# Patient Record
Sex: MALE | Race: WHITE | NOT HISPANIC OR LATINO | Employment: FULL TIME | URBAN - METROPOLITAN AREA
[De-identification: names, ages, dates, MRNs, and addresses within clinical notes are randomized per-mention and may not be internally consistent; named-entity substitution may affect disease eponyms.]

---

## 2017-07-13 ENCOUNTER — APPOINTMENT (OUTPATIENT)
Dept: PHYSICAL THERAPY | Facility: CLINIC | Age: 62
End: 2017-07-13
Payer: COMMERCIAL

## 2017-07-13 PROCEDURE — 97162 PT EVAL MOD COMPLEX 30 MIN: CPT

## 2017-07-13 PROCEDURE — 97112 NEUROMUSCULAR REEDUCATION: CPT

## 2017-07-17 ENCOUNTER — APPOINTMENT (OUTPATIENT)
Dept: PHYSICAL THERAPY | Facility: CLINIC | Age: 62
End: 2017-07-17
Payer: COMMERCIAL

## 2017-07-17 PROCEDURE — 97112 NEUROMUSCULAR REEDUCATION: CPT

## 2017-07-27 ENCOUNTER — APPOINTMENT (OUTPATIENT)
Dept: PHYSICAL THERAPY | Facility: CLINIC | Age: 62
End: 2017-07-27
Payer: COMMERCIAL

## 2017-07-27 PROCEDURE — 97112 NEUROMUSCULAR REEDUCATION: CPT

## 2021-04-08 DIAGNOSIS — Z23 ENCOUNTER FOR IMMUNIZATION: ICD-10-CM

## 2021-07-29 ENCOUNTER — OFFICE VISIT (OUTPATIENT)
Dept: BARIATRICS | Facility: CLINIC | Age: 66
End: 2021-07-29
Payer: COMMERCIAL

## 2021-07-29 VITALS
HEIGHT: 72 IN | WEIGHT: 265.4 LBS | BODY MASS INDEX: 35.95 KG/M2 | TEMPERATURE: 98.3 F | HEART RATE: 76 BPM | SYSTOLIC BLOOD PRESSURE: 120 MMHG | DIASTOLIC BLOOD PRESSURE: 66 MMHG | RESPIRATION RATE: 16 BRPM

## 2021-07-29 DIAGNOSIS — E66.9 OBESITY, CLASS II, BMI 35-39.9: Primary | ICD-10-CM

## 2021-07-29 DIAGNOSIS — G47.30 SLEEP APNEA WITH USE OF CONTINUOUS POSITIVE AIRWAY PRESSURE (CPAP): ICD-10-CM

## 2021-07-29 PROBLEM — E66.812 OBESITY, CLASS II, BMI 35-39.9: Status: ACTIVE | Noted: 2021-07-29

## 2021-07-29 PROCEDURE — 99203 OFFICE O/P NEW LOW 30 MIN: CPT | Performed by: FAMILY MEDICINE

## 2021-07-29 RX ORDER — ATORVASTATIN CALCIUM 10 MG/1
TABLET, FILM COATED ORAL
COMMUNITY
Start: 2015-07-01

## 2021-07-29 RX ORDER — CHLORAL HYDRATE 500 MG
CAPSULE ORAL
COMMUNITY

## 2021-07-29 NOTE — PATIENT INSTRUCTIONS
[place], [age] [address]   [phone]    Visit [facility] for further information/injection instructions  Please eat small frequent meals to help reduce nausea  Lemon water and saltine crackers may help with this  If you experience fever, nausea/vomiting, and pain radiating to your back this may be a sign of pancreatitis  Please have ER evaluation with this occurs

## 2021-07-29 NOTE — PROGRESS NOTES
Assessment/Plan:        Diagnoses and all orders for this visit:    Obesity, Class II, BMI 35-39 9  -     Semaglutide-Weight Management (WEGOVY) 0 25 MG/0 5ML; Inject 0 5 mL (0 25 mg total) under the skin once a week for 4 doses    Sleep apnea with use of continuous positive airway pressure (CPAP)  -     Semaglutide-Weight Management (WEGOVY) 0 25 MG/0 5ML; Inject 0 5 mL (0 25 mg total) under the skin once a week for 4 doses    Other orders  -     Omega-3 Fatty Acids (fish oil) 1,000 mg; Take by mouth  -     atorvastatin (LIPITOR) 10 mg tablet      -Discussed options of [facility]-Intensive Lifestyle Intervention Program, Very Low Calorie Diet-VLCD and Conservative Program and the role of weight loss medications   -Initial weight loss goal of 5-10% weight loss for improved health  -Screening labs- got labs done today at [facility]  -Patient is interested in pursuing Conservative Program  - he will be sending me copy of his recent labs done today, via [email or web address]    - he wants to try [facility] as he read about and discussed it with his PCP  We discussed wegovy in detail, discussed use, titration, admionistration and common side effects  Goals:  Food log (ie ) www myfitnesspal com,sparkpeople  com,loseit com,calorieking  com,etc  baritastic  No sugary beverages  At least 64oz of water daily  Increase physical activity by 10 minutes daily  Gradually increase physical activity to a goal of 5 days per week for 30 minutes of MODERATE intensity PLUS 2 days per week of FULL BODY resistance training  5831-7915 calories per day    45 minute visit, >50% face-to-face time spent counseling patient on surgical and nonsurgical interventions for the treatment of excess weight  Discussed in detail nonsurgical options including intensive lifestyle intervention program, very low-calorie diet program and conservative program   Discussed the role of weight loss medications    Counseled patient on diet behavior and exercise modification for weight loss  Follow up in approximately 1 month with Non-Surgical Physician/Advanced Practitioner  Subjective:   Chief Complaint   Patient presents with    Consult     MMWM consult       Patient ID: Adelaida Cooney  is a 78 y o  male with excess weight/obesity here to pursue weight management  No past medical history on file  HPI:  Obesity/Excess Weight:  Severity: class 2  Onset:  Most his life    Modifiers: Diet and Exercise  Contributing factors: Poor Food Choices, Insufficient Physical Activity and Lack of knowledge of appropriate lifestyle changes  Associated symptoms: comorbid conditions    Goals: 220lbs  Hydration:  Alcohol: beer once a week  Smoking: former smoker quit 2018  Exercise: walks a lot and is always active, [employer] 3 times a week  Sleep: 8hr  STOP bang: + LILY w CPAP    Social:  Lives with wife  Works full time as a /     The following portions of the patient's history were reviewed and updated as appropriate: allergies, current medications, past family history, past medical history, past social history, past surgical history and problem list     Review of Systems   Constitutional: Negative for activity change and appetite change  Respiratory: Negative  Cardiovascular: Negative  Gastrointestinal: Negative  Genitourinary: Negative  Musculoskeletal: Negative for arthralgias  Skin: Negative for rash  Psychiatric/Behavioral: Negative  Objective:    /66 (BP Location: Left arm, Patient Position: Sitting, Cuff Size: Adult)   Pulse 76   Temp 98 3 °F (36 8 °C) (Tympanic)   Resp 16   Ht 5' 11 8" (1 824 m)   Wt 120 kg (265 lb 6 4 oz)   BMI 36 20 kg/m²     Physical Exam    Constitutional   General appearance: Abnormal   well developed and obese  Eyes No conjunctival pallor      Musculoskeletal   Gait and station: Normal     Psychiatric   Orientation to person, place and time: Normal     Affect: appropriate

## 2021-08-02 PROBLEM — R73.01 IFG (IMPAIRED FASTING GLUCOSE): Status: ACTIVE | Noted: 2021-08-02

## 2021-08-23 DIAGNOSIS — R73.01 IFG (IMPAIRED FASTING GLUCOSE): ICD-10-CM

## 2021-08-23 DIAGNOSIS — G47.30 SLEEP APNEA WITH USE OF CONTINUOUS POSITIVE AIRWAY PRESSURE (CPAP): ICD-10-CM

## 2021-08-23 DIAGNOSIS — E66.9 OBESITY, CLASS II, BMI 35-39.9: Primary | ICD-10-CM

## 2021-08-24 ENCOUNTER — OFFICE VISIT (OUTPATIENT)
Dept: PODIATRY | Facility: CLINIC | Age: 66
End: 2021-08-24
Payer: COMMERCIAL

## 2021-08-24 VITALS
SYSTOLIC BLOOD PRESSURE: 138 MMHG | WEIGHT: 264 LBS | HEIGHT: 72 IN | HEART RATE: 75 BPM | BODY MASS INDEX: 35.76 KG/M2 | DIASTOLIC BLOOD PRESSURE: 82 MMHG

## 2021-08-24 DIAGNOSIS — M21.619 BUNION: ICD-10-CM

## 2021-08-24 DIAGNOSIS — G57.53 TARSAL TUNNEL SYNDROME, BILATERAL: ICD-10-CM

## 2021-08-24 DIAGNOSIS — M79.671 RIGHT FOOT PAIN: ICD-10-CM

## 2021-08-24 DIAGNOSIS — B07.0 PLANTAR WARTS: Primary | ICD-10-CM

## 2021-08-24 PROCEDURE — 17110 DESTRUCTION B9 LES UP TO 14: CPT | Performed by: PODIATRIST

## 2021-08-24 PROCEDURE — 99203 OFFICE O/P NEW LOW 30 MIN: CPT | Performed by: PODIATRIST

## 2021-08-24 NOTE — PROGRESS NOTES
PATIENT:  Teto Roche  1955       ASSESSMENT:     1  Plantar warts  Lesion Destruction   2  Tarsal tunnel syndrome, bilateral     3  Bunion     4  Right foot pain               PLAN:  1  Patient was counseled and educated on the condition and the diagnosis  2  The diagnosis, treatment options and prognosis were discussed with the patient  3  Skin lesions are most consistent with plantar warts  Discussed options and the patient wished to proceed with chemical cauterization  Verbal consent was obtained from the patient  All the verrucoid lesion(s) and any surround hyperkeratotic skin lesions were debrided to a level of pinpoint bleeding using a sterile #15 scapel  The lesions were then cauterized with Cantharidin  An occlusive dressing was applied to the areas  Instructed to remove the dressing in the morning  Instruction was given for possible local care  The patient tolerated the procedure well and without complications  4   Burning sensation in plantar feet is consistent with mild tarsal tunnel syndrome  Instructed supportive care, home exercise, warm soaking, and proper footwear/ arch support to minimize pronation of feet  5   Discussed supportive care and proper footwear to accommodate bunion deformity  6  Patient will return in 2 weeks for re-evaluation  Lesion Destruction    Date/Time: 8/24/2021 6:37 PM  Performed by: Jason Adams DPM  Authorized by: Jason Adams DPM   [place] Protocol:  Consent: Verbal consent obtained  Risks and benefits: risks, benefits and alternatives were discussed  Consent given by: patient  Time out: Immediately prior to procedure a "time out" was called to verify the correct patient, procedure, equipment, support staff and site/side marked as required    Timeout called at: 8/24/2021 4:37 PM   Patient understanding: patient states understanding of the procedure being performed  Patient identity confirmed: verbally with patient      Procedure Details - Lesion Destruction:     Number of Lesions:  3  Lesion 1:     Body area:  Lower extremity    Lower extremity location:  R foot    Malignancy: benign lesion      Destruction method: chemical removal    Lesion 2:     Body area:  Lower extremity    Lower extremity location:  R foot    Malignancy: benign lesion      Destruction method: chemical removal    Lesion 3:     Body area:  Lower extremity    Lower extremity location:  R foot    Malignancy: benign lesion      Destruction method: chemical removal            Subjective:       HPI  The patient presents with chief complaint of pain on right foot  He noticed some sharp pain on right plantar foot for last 2 months  He noticed a hard skin  No drainage or redness  No bleeding  He denied any skin injury  He also has some burning sensation plantar feet bilaterally for a while  Increased symptoms after being on his feet a lot  Symptoms are usually manageable  He denied any injury  No acute edema or redness  No significant numbness  No significant weakness  He also has foot deformity  The following portions of the patient's history were reviewed and updated as appropriate: allergies, current medications, past family history, past medical history, past social history, past surgical history and problem list   All pertinent labs and images were reviewed  Past Medical History  Past Medical History:   Diagnosis Date    LILY (obstructive sleep apnea)     Verruca 1972    planters warts       Past Surgical History  Past Surgical History:   Procedure Laterality Date    TOENAIL EXCISION  2011    grew back    TONSILLECTOMY  1959        Allergies:  Patient has no known allergies      Medications:  Current Outpatient Medications   Medication Sig Dispense Refill    atorvastatin (LIPITOR) 10 mg tablet       Omega-3 Fatty Acids (fish oil) 1,000 mg Take by mouth      Semaglutide-Weight Management [facility]) 0 5 MG/0 5ML Inject 0 5 mL (0 5 mg total) under the skin once a week for 4 doses 2 mL 0     No current facility-administered medications for this visit  Social History:  Social History     Socioeconomic History    Marital status: /Civil Union     Spouse name: None    Number of children: None    Years of education: None    Highest education level: None   Occupational History    None   Tobacco Use    Smoking status: Former Smoker     Packs/day: 1 50     Years: 48 00     Pack years: 72 00     Types: Cigarettes     Start date: 7/1/1970     Quit date: 8/23/2018     Years since quitting: 3 0    Smokeless tobacco: Never Used   Vaping Use    Vaping Use: Never used   Substance and Sexual Activity    Alcohol use: Yes     Alcohol/week: 10 0 standard drinks     Types: 10 Cans of beer per week     Comment: per week    Drug use: Never    Sexual activity: Yes     Partners: Female   Other Topics Concern    None   Social History Narrative    None     Social Determinants of Health     Financial Resource Strain:     Difficulty of Paying Living Expenses:    Food Insecurity:     Worried About Running Out of Food in the Last Year:     Ran Out of Food in the Last Year:    Transportation Needs:     Lack of Transportation (Medical):  Lack of Transportation (Non-Medical):    Physical Activity:     Days of Exercise per Week:     Minutes of Exercise per Session:    Stress:     Feeling of Stress :    Social Connections:     Frequency of Communication with Friends and Family:     Frequency of Social Gatherings with Friends and Family:     Attends Jain Services:     Active Member of Clubs or Organizations:     Attends Club or Organization Meetings:     Marital Status:    Intimate Partner Violence:     Fear of Current or Ex-Partner:     Emotionally Abused:     Physically Abused:     Sexually Abused:           Review of Systems   Constitutional: Negative for appetite change, chills and fever     HENT: Negative for sore throat  Respiratory: Negative for cough and shortness of breath  Cardiovascular: Negative for chest pain and leg swelling  Gastrointestinal: Negative for diarrhea, nausea and vomiting  Musculoskeletal: Negative for gait problem and joint swelling  Skin: Negative for rash and wound  Allergic/Immunologic: Negative for immunocompromised state  Neurological: Negative for weakness and numbness  Hematological: Negative  Psychiatric/Behavioral: Negative for behavioral problems and confusion  Objective:      /82   Pulse 75   Ht 5' 11 8" (1 824 m)   Wt 120 kg (264 lb)   BMI 36 00 kg/m²          Physical Exam  Vitals reviewed  Constitutional:       General: He is not in acute distress  Appearance: He is obese  He is not ill-appearing or toxic-appearing  HENT:      Head: Normocephalic and atraumatic  Eyes:      Extraocular Movements: Extraocular movements intact  Cardiovascular:      Rate and Rhythm: Normal rate and regular rhythm  Pulses: Normal pulses  Dorsalis pedis pulses are 2+ on the right side and 2+ on the left side  Posterior tibial pulses are 2+ on the right side and 2+ on the left side  Pulmonary:      Effort: Pulmonary effort is normal  No respiratory distress  Musculoskeletal:         General: Deformity present  No swelling, tenderness or signs of injury  Cervical back: Normal range of motion and neck supple  Right lower leg: No edema  Left lower leg: No edema  Right foot: No foot drop  Left foot: No foot drop  Comments: Bunion deformity presents  Hyperpronation noted  Skin:     General: Skin is warm  Capillary Refill: Capillary refill takes less than 2 seconds  Coloration: Skin is not cyanotic or mottled  Findings: No abscess, ecchymosis, erythema or rash  Nails: There is no clubbing  Comments: Circular keratosis with punctate keratosis around the sulcus of right 4th toe    Two other small verrucoid lesions on right heel and plantar midfoot  Neurological:      General: No focal deficit present  Mental Status: He is alert and oriented to person, place, and time  Cranial Nerves: No cranial nerve deficit  Sensory: No sensory deficit  Motor: No weakness  Coordination: Coordination normal       Comments: Mild Tinel sign presents bilateral tarsal tunnel  Psychiatric:         Mood and Affect: Mood normal          Behavior: Behavior normal          Thought Content:  Thought content normal          Judgment: Judgment normal

## 2021-08-27 ENCOUNTER — OFFICE VISIT (OUTPATIENT)
Dept: BARIATRICS | Facility: CLINIC | Age: 66
End: 2021-08-27
Payer: COMMERCIAL

## 2021-08-27 VITALS
WEIGHT: 259.7 LBS | TEMPERATURE: 98.4 F | SYSTOLIC BLOOD PRESSURE: 112 MMHG | HEIGHT: 72 IN | HEART RATE: 73 BPM | DIASTOLIC BLOOD PRESSURE: 66 MMHG | RESPIRATION RATE: 16 BRPM | BODY MASS INDEX: 35.18 KG/M2

## 2021-08-27 DIAGNOSIS — E66.9 OBESITY, CLASS II, BMI 35-39.9: Primary | ICD-10-CM

## 2021-08-27 DIAGNOSIS — G47.30 SLEEP APNEA WITH USE OF CONTINUOUS POSITIVE AIRWAY PRESSURE (CPAP): ICD-10-CM

## 2021-08-27 DIAGNOSIS — R73.01 IFG (IMPAIRED FASTING GLUCOSE): ICD-10-CM

## 2021-08-27 PROCEDURE — 99214 OFFICE O/P EST MOD 30 MIN: CPT | Performed by: FAMILY MEDICINE

## 2021-08-27 NOTE — PROGRESS NOTES
Assessment/Plan:    No problem-specific Assessment & Plan notes found for this encounter  Diagnoses and all orders for this visit:    Obesity, Class II, BMI 35-39 9  -     Semaglutide-Weight Management (WEGOVY) 1 MG/0 5ML; Inject 0 5 mL (1 mg total) under the skin once a week for 4 doses    IFG (impaired fasting glucose)  -     Semaglutide-Weight Management (WEGOVY) 1 MG/0 5ML; Inject 0 5 mL (1 mg total) under the skin once a week for 4 doses    Sleep apnea with use of continuous positive airway pressure (CPAP)  -     Semaglutide-Weight Management (WEGOVY) 1 MG/0 5ML; Inject 0 5 mL (1 mg total) under the skin once a week for 4 doses        -Patient is pursuing Conservative Program  -Initial weight loss goal of 5-10% weight loss for improved health  -Screening labs- labs from [address] [place] [address] on 7/29/21 (sent via [email or web address]) of note A1c was 6 1%  - on wegovy 0 25mg qw, tolerating well, filled the next 2 dose on the titration  Initial: 265 4lbs  Current: 259 7lbs  Change: -5 7lbs  Goal: 220lbs    Goals:  Food log (ie ) www myfitnesspal com,sparkpeople  com,loseit com,calorieking  com,etc  baritastic  No sugary beverages  At least 64oz of water daily  Increase physical activity by 10 minutes daily  Gradually increase physical activity to a goal of 5 days per week for 30 minutes of MODERATE intensity PLUS 2 days per week of FULL BODY resistance training  8610-3968 calories per day     Follow up in approximately 2 months with Non-Surgical Physician/Advanced Practitioner  Subjective:   Chief Complaint   Patient presents with    Follow-up     1 month MWM follow up        Patient ID: Jessica Lee  is a 78 y o  male with excess weight/obesity here to pursue weight management  Patient is pursuing Conservative Program      HPI     1 month follow up    Started wegovy last visit  Doing well, no side effect  Lost -5 7lbs    Has been eating healthy, making better choices   Has not noted full med effects yet on fullness or appetitie  The following portions of the patient's history were reviewed and updated as appropriate: allergies, current medications, past family history, past medical history, past social history, past surgical history and problem list     Review of Systems   Constitutional: Negative for activity change and appetite change  Respiratory: Negative  Cardiovascular: Negative  Gastrointestinal: Negative  Genitourinary: Negative  Musculoskeletal: Negative for arthralgias  Skin: Negative for rash  Psychiatric/Behavioral: Negative  Objective:    /66 (BP Location: Left arm, Patient Position: Sitting, Cuff Size: Adult)   Pulse 73   Temp 98 4 °F (36 9 °C) (Tympanic)   Resp 16   Ht 5' 11 8" (1 824 m)   Wt 118 kg (259 lb 11 2 oz)   BMI 35 42 kg/m²      Physical Exam     Constitutional   General appearance: Abnormal   well developed and obese  Eyes No conjunctival pallor     Musculoskeletal   Gait and station: Normal     Psychiatric   Orientation to person, place and time: Normal     Affect: appropriate

## 2021-09-10 ENCOUNTER — OFFICE VISIT (OUTPATIENT)
Dept: PODIATRY | Facility: CLINIC | Age: 66
End: 2021-09-10
Payer: COMMERCIAL

## 2021-09-10 VITALS
WEIGHT: 262.2 LBS | HEIGHT: 71 IN | DIASTOLIC BLOOD PRESSURE: 77 MMHG | HEART RATE: 65 BPM | SYSTOLIC BLOOD PRESSURE: 125 MMHG | BODY MASS INDEX: 36.71 KG/M2

## 2021-09-10 DIAGNOSIS — B07.0 PLANTAR WARTS: Primary | ICD-10-CM

## 2021-09-10 DIAGNOSIS — M21.619 BUNION: ICD-10-CM

## 2021-09-10 DIAGNOSIS — G57.53 TARSAL TUNNEL SYNDROME, BILATERAL: ICD-10-CM

## 2021-09-10 PROCEDURE — 99213 OFFICE O/P EST LOW 20 MIN: CPT | Performed by: PODIATRIST

## 2021-09-10 NOTE — PROGRESS NOTES
PATIENT:  Shauna Coppola  1955       ASSESSMENT:     1  Plantar warts     2  Tarsal tunnel syndrome, bilateral     3  Bunion               PLAN:  1  Patient was counseled and educated on the condition and the diagnosis  2  Reviewed the last office note  The diagnosis, treatment options and prognosis were discussed with the patient  3  Continue chemical cauterization  Verbal consent was obtained from the patient  All the verrucoid lesion(s) and any surround hyperkeratotic skin lesions were debrided to a level of pinpoint bleeding using a sterile #15 scapel  The lesions were then cauterized with Cantharidin  An occlusive dressing was applied to the areas  Instructed to remove the dressing before bed  Instruction was given for possible local care  The patient tolerated the procedure well and without complications  4  Instructed to continue supportive care, home exercise, warm soaking, and proper footwear/ arch support  5   Patient will return in 2 weeks for re-evaluation  Subjective:       HPI  The patient presents for foot evaluation  Pain is much better after the last visit  He also noticed decreased neurologic symptoms in his feet with home exercise and stretching  No acute edema or redness  No significant numbness  No significant weakness  The following portions of the patient's history were reviewed and updated as appropriate: allergies, current medications, past family history, past medical history, past social history, past surgical history and problem list   All pertinent labs and images were reviewed        Past Medical History  Past Medical History:   Diagnosis Date    LILY (obstructive sleep apnea)     Plantar wart     Verruca 1972    planters warts       Past Surgical History  Past Surgical History:   Procedure Laterality Date    TOENAIL EXCISION  2011    grew back    TONSILLECTOMY  1959        Allergies:  Patient has no known allergies  Medications:  Current Outpatient Medications   Medication Sig Dispense Refill    atorvastatin (LIPITOR) 10 mg tablet       Omega-3 Fatty Acids (fish oil) 1,000 mg Take by mouth      Semaglutide-Weight Management [facility]) 0 5 MG/0 5ML Inject 0 5 mL (0 5 mg total) under the skin once a week for 4 doses 2 mL 0    Semaglutide-Weight Management (WEGOVY) 1 MG/0 5ML Inject 0 5 mL (1 mg total) under the skin once a week for 4 doses 2 mL 0     No current facility-administered medications for this visit  Social History:  Social History     Socioeconomic History    Marital status: /Civil Union     Spouse name: None    Number of children: None    Years of education: None    Highest education level: None   Occupational History    None   Tobacco Use    Smoking status: Former Smoker     Packs/day: 1 50     Years: 48 00     Pack years: 72 00     Types: Cigarettes     Start date: 7/1/1970     Quit date: 8/23/2018     Years since quitting: 3 0    Smokeless tobacco: Never Used   Vaping Use    Vaping Use: Never used   Substance and Sexual Activity    Alcohol use: Yes     Alcohol/week: 10 0 standard drinks     Types: 10 Cans of beer per week     Comment: per week    Drug use: Never    Sexual activity: Yes     Partners: Female   Other Topics Concern    None   Social History Narrative    None     Social Determinants of Health     Financial Resource Strain:     Difficulty of Paying Living Expenses:    Food Insecurity:     Worried About Running Out of Food in the Last Year:     Ran Out of Food in the Last Year:    Transportation Needs:     Lack of Transportation (Medical):      Lack of Transportation (Non-Medical):    Physical Activity:     Days of Exercise per Week:     Minutes of Exercise per Session:    Stress:     Feeling of Stress :    Social Connections:     Frequency of Communication with Friends and Family:     Frequency of Social Gatherings with Friends and Family:     Attends Denominational Services:     Active Member of Clubs or Organizations:     Attends Club or Organization Meetings:     Marital Status:    Intimate Partner Violence:     Fear of Current or Ex-Partner:     Emotionally Abused:     Physically Abused:     Sexually Abused:           Review of Systems   Constitutional: Negative for appetite change, chills and fever  Respiratory: Negative for cough and shortness of breath  Cardiovascular: Negative for chest pain and leg swelling  Gastrointestinal: Negative for diarrhea, nausea and vomiting  Musculoskeletal: Negative for gait problem and joint swelling  Neurological: Negative for numbness  Objective:      /77   Pulse 65   Ht 5' 11" (1 803 m) Comment: verbal  Wt 119 kg (262 lb 3 2 oz)   BMI 36 57 kg/m²          Physical Exam  Vitals reviewed  Constitutional:       General: He is not in acute distress  Appearance: He is obese  He is not ill-appearing or toxic-appearing  Cardiovascular:      Rate and Rhythm: Normal rate and regular rhythm  Pulses: Normal pulses  Dorsalis pedis pulses are 2+ on the right side and 2+ on the left side  Posterior tibial pulses are 2+ on the right side and 2+ on the left side  Pulmonary:      Effort: Pulmonary effort is normal  No respiratory distress  Musculoskeletal:         General: Deformity present  No swelling, tenderness or signs of injury  Right lower leg: No edema  Left lower leg: No edema  Right foot: No foot drop  Left foot: No foot drop  Comments: Bunion deformity presents  Skin:     General: Skin is warm  Capillary Refill: Capillary refill takes less than 2 seconds  Coloration: Skin is not cyanotic or mottled  Findings: No abscess, ecchymosis, erythema or rash  Nails: There is no clubbing  Comments: Circular keratosis with punctate keratosis around the sulcus of right 4th toe  Decreased keratosis    Two other small lesions on right foot look resolved  Neurological:      General: No focal deficit present  Mental Status: He is alert and oriented to person, place, and time  Cranial Nerves: No cranial nerve deficit  Sensory: No sensory deficit  Motor: No weakness  Coordination: Coordination normal       Comments: Mild Tinel sign presents bilateral tarsal tunnel  Psychiatric:         Mood and Affect: Mood normal          Behavior: Behavior normal          Thought Content:  Thought content normal          Judgment: Judgment normal

## 2021-09-14 DIAGNOSIS — E66.9 OBESITY, CLASS II, BMI 35-39.9: Primary | ICD-10-CM

## 2021-09-14 DIAGNOSIS — R73.01 IFG (IMPAIRED FASTING GLUCOSE): ICD-10-CM

## 2021-09-14 DIAGNOSIS — G47.30 SLEEP APNEA WITH USE OF CONTINUOUS POSITIVE AIRWAY PRESSURE (CPAP): ICD-10-CM

## 2021-09-21 DIAGNOSIS — R73.01 IFG (IMPAIRED FASTING GLUCOSE): ICD-10-CM

## 2021-09-21 DIAGNOSIS — G47.30 SLEEP APNEA WITH USE OF CONTINUOUS POSITIVE AIRWAY PRESSURE (CPAP): ICD-10-CM

## 2021-09-21 DIAGNOSIS — E66.9 OBESITY, CLASS II, BMI 35-39.9: ICD-10-CM

## 2021-09-28 ENCOUNTER — IMMUNIZATIONS (OUTPATIENT)
Dept: FAMILY MEDICINE CLINIC | Facility: HOSPITAL | Age: 66
End: 2021-09-28

## 2021-09-28 DIAGNOSIS — Z23 ENCOUNTER FOR IMMUNIZATION: Primary | ICD-10-CM

## 2021-09-28 PROCEDURE — 91300 SARS-COV-2 / COVID-19 MRNA VACCINE (PFIZER-BIONTECH) 30 MCG: CPT

## 2021-09-28 PROCEDURE — 0001A SARS-COV-2 / COVID-19 MRNA VACCINE (PFIZER-BIONTECH) 30 MCG: CPT

## 2021-10-05 ENCOUNTER — OFFICE VISIT (OUTPATIENT)
Dept: PODIATRY | Facility: CLINIC | Age: 66
End: 2021-10-05
Payer: COMMERCIAL

## 2021-10-05 VITALS
HEART RATE: 75 BPM | SYSTOLIC BLOOD PRESSURE: 116 MMHG | BODY MASS INDEX: 36.37 KG/M2 | WEIGHT: 259.8 LBS | DIASTOLIC BLOOD PRESSURE: 73 MMHG | HEIGHT: 71 IN

## 2021-10-05 DIAGNOSIS — G57.53 TARSAL TUNNEL SYNDROME, BILATERAL: ICD-10-CM

## 2021-10-05 DIAGNOSIS — B07.0 PLANTAR WARTS: Primary | ICD-10-CM

## 2021-10-05 PROCEDURE — 99213 OFFICE O/P EST LOW 20 MIN: CPT | Performed by: PODIATRIST

## 2021-10-14 DIAGNOSIS — R73.01 IFG (IMPAIRED FASTING GLUCOSE): ICD-10-CM

## 2021-10-14 DIAGNOSIS — E66.9 OBESITY, CLASS II, BMI 35-39.9: ICD-10-CM

## 2021-10-14 DIAGNOSIS — G47.30 SLEEP APNEA WITH USE OF CONTINUOUS POSITIVE AIRWAY PRESSURE (CPAP): ICD-10-CM

## 2021-10-15 ENCOUNTER — EVALUATION (OUTPATIENT)
Dept: PHYSICAL THERAPY | Facility: REHABILITATION | Age: 66
End: 2021-10-15
Payer: COMMERCIAL

## 2021-10-15 DIAGNOSIS — G57.53 TARSAL TUNNEL SYNDROME, BILATERAL: Primary | ICD-10-CM

## 2021-10-15 DIAGNOSIS — M79.671 FOOT PAIN, BILATERAL: ICD-10-CM

## 2021-10-15 DIAGNOSIS — M79.672 FOOT PAIN, BILATERAL: ICD-10-CM

## 2021-10-15 PROCEDURE — 97140 MANUAL THERAPY 1/> REGIONS: CPT

## 2021-10-15 PROCEDURE — 97110 THERAPEUTIC EXERCISES: CPT

## 2021-10-15 PROCEDURE — 97162 PT EVAL MOD COMPLEX 30 MIN: CPT

## 2021-10-15 PROCEDURE — 97112 NEUROMUSCULAR REEDUCATION: CPT

## 2021-10-15 RX ORDER — SEMAGLUTIDE 2.4 MG/.75ML
INJECTION, SOLUTION SUBCUTANEOUS
Qty: 3 ML | Refills: 0 | Status: SHIPPED | OUTPATIENT
Start: 2021-10-15 | End: 2021-11-09

## 2021-10-21 ENCOUNTER — OFFICE VISIT (OUTPATIENT)
Dept: PHYSICAL THERAPY | Facility: REHABILITATION | Age: 66
End: 2021-10-21
Payer: COMMERCIAL

## 2021-10-21 DIAGNOSIS — M79.672 FOOT PAIN, BILATERAL: ICD-10-CM

## 2021-10-21 DIAGNOSIS — M79.671 FOOT PAIN, BILATERAL: ICD-10-CM

## 2021-10-21 DIAGNOSIS — G57.53 TARSAL TUNNEL SYNDROME, BILATERAL: Primary | ICD-10-CM

## 2021-10-21 PROCEDURE — 97112 NEUROMUSCULAR REEDUCATION: CPT

## 2021-10-21 PROCEDURE — 97110 THERAPEUTIC EXERCISES: CPT

## 2021-10-21 PROCEDURE — 97530 THERAPEUTIC ACTIVITIES: CPT

## 2021-10-26 ENCOUNTER — OFFICE VISIT (OUTPATIENT)
Dept: PHYSICAL THERAPY | Facility: REHABILITATION | Age: 66
End: 2021-10-26
Payer: COMMERCIAL

## 2021-10-26 DIAGNOSIS — M79.671 FOOT PAIN, BILATERAL: ICD-10-CM

## 2021-10-26 DIAGNOSIS — G57.53 TARSAL TUNNEL SYNDROME, BILATERAL: Primary | ICD-10-CM

## 2021-10-26 DIAGNOSIS — M79.672 FOOT PAIN, BILATERAL: ICD-10-CM

## 2021-10-26 PROCEDURE — 97112 NEUROMUSCULAR REEDUCATION: CPT

## 2021-10-26 PROCEDURE — 97530 THERAPEUTIC ACTIVITIES: CPT

## 2021-10-26 PROCEDURE — 97110 THERAPEUTIC EXERCISES: CPT

## 2021-10-28 ENCOUNTER — OFFICE VISIT (OUTPATIENT)
Dept: BARIATRICS | Facility: CLINIC | Age: 66
End: 2021-10-28
Payer: COMMERCIAL

## 2021-10-28 VITALS
WEIGHT: 256.9 LBS | BODY MASS INDEX: 34.8 KG/M2 | HEIGHT: 72 IN | SYSTOLIC BLOOD PRESSURE: 110 MMHG | TEMPERATURE: 97.1 F | DIASTOLIC BLOOD PRESSURE: 65 MMHG | HEART RATE: 78 BPM

## 2021-10-28 DIAGNOSIS — E66.9 OBESITY, CLASS II, BMI 35-39.9: Primary | ICD-10-CM

## 2021-10-28 DIAGNOSIS — G47.30 SLEEP APNEA WITH USE OF CONTINUOUS POSITIVE AIRWAY PRESSURE (CPAP): ICD-10-CM

## 2021-10-28 DIAGNOSIS — R73.01 IFG (IMPAIRED FASTING GLUCOSE): ICD-10-CM

## 2021-10-28 PROCEDURE — 99214 OFFICE O/P EST MOD 30 MIN: CPT | Performed by: FAMILY MEDICINE

## 2021-10-29 ENCOUNTER — OFFICE VISIT (OUTPATIENT)
Dept: PHYSICAL THERAPY | Facility: REHABILITATION | Age: 66
End: 2021-10-29
Payer: COMMERCIAL

## 2021-10-29 DIAGNOSIS — G57.53 TARSAL TUNNEL SYNDROME, BILATERAL: Primary | ICD-10-CM

## 2021-10-29 DIAGNOSIS — M79.671 FOOT PAIN, BILATERAL: ICD-10-CM

## 2021-10-29 DIAGNOSIS — M79.672 FOOT PAIN, BILATERAL: ICD-10-CM

## 2021-10-29 PROCEDURE — 97530 THERAPEUTIC ACTIVITIES: CPT

## 2021-10-29 PROCEDURE — 97112 NEUROMUSCULAR REEDUCATION: CPT

## 2021-10-29 PROCEDURE — 97110 THERAPEUTIC EXERCISES: CPT

## 2021-11-01 ENCOUNTER — OFFICE VISIT (OUTPATIENT)
Dept: PHYSICAL THERAPY | Facility: REHABILITATION | Age: 66
End: 2021-11-01
Payer: COMMERCIAL

## 2021-11-01 DIAGNOSIS — M79.671 FOOT PAIN, BILATERAL: ICD-10-CM

## 2021-11-01 DIAGNOSIS — G57.53 TARSAL TUNNEL SYNDROME, BILATERAL: Primary | ICD-10-CM

## 2021-11-01 DIAGNOSIS — M79.672 FOOT PAIN, BILATERAL: ICD-10-CM

## 2021-11-01 PROCEDURE — 97164 PT RE-EVAL EST PLAN CARE: CPT

## 2021-11-01 PROCEDURE — 97110 THERAPEUTIC EXERCISES: CPT

## 2021-11-01 PROCEDURE — 97112 NEUROMUSCULAR REEDUCATION: CPT

## 2021-11-04 ENCOUNTER — APPOINTMENT (OUTPATIENT)
Dept: PHYSICAL THERAPY | Facility: REHABILITATION | Age: 66
End: 2021-11-04
Payer: COMMERCIAL

## 2021-11-08 ENCOUNTER — APPOINTMENT (OUTPATIENT)
Dept: PHYSICAL THERAPY | Facility: REHABILITATION | Age: 66
End: 2021-11-08
Payer: COMMERCIAL

## 2021-11-11 ENCOUNTER — APPOINTMENT (OUTPATIENT)
Dept: PHYSICAL THERAPY | Facility: REHABILITATION | Age: 66
End: 2021-11-11
Payer: COMMERCIAL

## 2021-11-18 ENCOUNTER — OFFICE VISIT (OUTPATIENT)
Dept: PODIATRY | Facility: CLINIC | Age: 66
End: 2021-11-18
Payer: COMMERCIAL

## 2021-11-18 VITALS
SYSTOLIC BLOOD PRESSURE: 114 MMHG | HEIGHT: 71 IN | HEART RATE: 85 BPM | BODY MASS INDEX: 35.84 KG/M2 | DIASTOLIC BLOOD PRESSURE: 74 MMHG | WEIGHT: 256 LBS

## 2021-11-18 DIAGNOSIS — M77.51 CAPSULITIS OF METATARSOPHALANGEAL (MTP) JOINT OF RIGHT FOOT: ICD-10-CM

## 2021-11-18 DIAGNOSIS — B07.0 PLANTAR WARTS: Primary | ICD-10-CM

## 2021-11-18 DIAGNOSIS — M77.41 METATARSALGIA, RIGHT FOOT: ICD-10-CM

## 2021-11-18 DIAGNOSIS — M20.11 HALLUX VALGUS, ACQUIRED, BILATERAL: ICD-10-CM

## 2021-11-18 DIAGNOSIS — M20.12 HALLUX VALGUS, ACQUIRED, BILATERAL: ICD-10-CM

## 2021-11-18 DIAGNOSIS — G57.53 TARSAL TUNNEL SYNDROME, BILATERAL: ICD-10-CM

## 2021-11-18 PROCEDURE — 17110 DESTRUCTION B9 LES UP TO 14: CPT | Performed by: PODIATRIST

## 2021-11-18 PROCEDURE — 99213 OFFICE O/P EST LOW 20 MIN: CPT | Performed by: PODIATRIST

## 2021-12-06 ENCOUNTER — TELEPHONE (OUTPATIENT)
Dept: BARIATRICS | Facility: CLINIC | Age: 66
End: 2021-12-06

## 2021-12-07 ENCOUNTER — TELEPHONE (OUTPATIENT)
Dept: BARIATRICS | Facility: CLINIC | Age: 66
End: 2021-12-07

## 2021-12-10 ENCOUNTER — TELEPHONE (OUTPATIENT)
Dept: BARIATRICS | Facility: CLINIC | Age: 66
End: 2021-12-10

## 2021-12-28 ENCOUNTER — OFFICE VISIT (OUTPATIENT)
Dept: BARIATRICS | Facility: CLINIC | Age: 66
End: 2021-12-28
Payer: COMMERCIAL

## 2021-12-28 VITALS
BODY MASS INDEX: 34.43 KG/M2 | HEIGHT: 72 IN | HEART RATE: 78 BPM | WEIGHT: 254.2 LBS | SYSTOLIC BLOOD PRESSURE: 106 MMHG | RESPIRATION RATE: 16 BRPM | DIASTOLIC BLOOD PRESSURE: 66 MMHG | TEMPERATURE: 97.6 F

## 2021-12-28 DIAGNOSIS — E78.5 HYPERLIPIDEMIA: ICD-10-CM

## 2021-12-28 DIAGNOSIS — E66.9 OBESITY, CLASS II, BMI 35-39.9: Primary | ICD-10-CM

## 2021-12-28 DIAGNOSIS — G47.30 SLEEP APNEA WITH USE OF CONTINUOUS POSITIVE AIRWAY PRESSURE (CPAP): ICD-10-CM

## 2021-12-28 DIAGNOSIS — R73.03 PREDIABETES: ICD-10-CM

## 2021-12-28 PROCEDURE — 99214 OFFICE O/P EST MOD 30 MIN: CPT | Performed by: PHYSICIAN ASSISTANT

## 2021-12-28 RX ORDER — SEMAGLUTIDE 1.34 MG/ML
1 INJECTION, SOLUTION SUBCUTANEOUS WEEKLY
Qty: 3 ML | Refills: 1 | Status: SHIPPED | OUTPATIENT
Start: 2021-12-28 | End: 2022-02-17 | Stop reason: SDUPTHER

## 2022-01-06 ENCOUNTER — PROCEDURE VISIT (OUTPATIENT)
Dept: PODIATRY | Facility: CLINIC | Age: 67
End: 2022-01-06
Payer: COMMERCIAL

## 2022-01-06 VITALS — BODY MASS INDEX: 35.03 KG/M2 | WEIGHT: 258.6 LBS | HEIGHT: 72 IN

## 2022-01-06 DIAGNOSIS — B07.0 PLANTAR WARTS: Primary | ICD-10-CM

## 2022-01-06 DIAGNOSIS — M20.12 HALLUX VALGUS, ACQUIRED, BILATERAL: ICD-10-CM

## 2022-01-06 DIAGNOSIS — M20.11 HALLUX VALGUS, ACQUIRED, BILATERAL: ICD-10-CM

## 2022-01-06 DIAGNOSIS — G57.53 TARSAL TUNNEL SYNDROME, BILATERAL: ICD-10-CM

## 2022-01-06 DIAGNOSIS — M77.41 METATARSALGIA, RIGHT FOOT: ICD-10-CM

## 2022-01-06 PROCEDURE — S0395 IMPRESSION CASTING FT: HCPCS | Performed by: PODIATRIST

## 2022-01-06 NOTE — PROGRESS NOTES
Biomechanical exam was done and he was casted for orthotics  Continue supportive care and home exercise  Wart looks resolved  Monitor for any recurrence  Will call him when it is ready to be picked

## 2022-01-27 ENCOUNTER — TELEPHONE (OUTPATIENT)
Dept: PODIATRY | Facility: CLINIC | Age: 67
End: 2022-01-27

## 2022-01-28 ENCOUNTER — TELEPHONE (OUTPATIENT)
Dept: PODIATRY | Facility: CLINIC | Age: 67
End: 2022-01-28

## 2022-02-17 DIAGNOSIS — E66.9 OBESITY, CLASS II, BMI 35-39.9: ICD-10-CM

## 2022-02-17 DIAGNOSIS — E78.5 HYPERLIPIDEMIA: ICD-10-CM

## 2022-02-17 DIAGNOSIS — R73.03 PREDIABETES: ICD-10-CM

## 2022-02-17 DIAGNOSIS — G47.30 SLEEP APNEA WITH USE OF CONTINUOUS POSITIVE AIRWAY PRESSURE (CPAP): ICD-10-CM

## 2022-02-17 RX ORDER — SEMAGLUTIDE 1.34 MG/ML
1 INJECTION, SOLUTION SUBCUTANEOUS WEEKLY
Qty: 9 ML | Refills: 0 | Status: SHIPPED | OUTPATIENT
Start: 2022-02-17 | End: 2022-03-22 | Stop reason: SDUPTHER

## 2022-03-22 ENCOUNTER — OFFICE VISIT (OUTPATIENT)
Dept: BARIATRICS | Facility: CLINIC | Age: 67
End: 2022-03-22
Payer: COMMERCIAL

## 2022-03-22 VITALS
SYSTOLIC BLOOD PRESSURE: 124 MMHG | BODY MASS INDEX: 35.5 KG/M2 | DIASTOLIC BLOOD PRESSURE: 78 MMHG | WEIGHT: 262.1 LBS | HEIGHT: 72 IN | TEMPERATURE: 97.8 F | HEART RATE: 76 BPM

## 2022-03-22 DIAGNOSIS — E66.9 OBESITY, CLASS II, BMI 35-39.9: Primary | ICD-10-CM

## 2022-03-22 DIAGNOSIS — G47.30 SLEEP APNEA WITH USE OF CONTINUOUS POSITIVE AIRWAY PRESSURE (CPAP): ICD-10-CM

## 2022-03-22 DIAGNOSIS — R73.03 PREDIABETES: ICD-10-CM

## 2022-03-22 DIAGNOSIS — E78.5 HYPERLIPIDEMIA: ICD-10-CM

## 2022-03-22 PROCEDURE — 99214 OFFICE O/P EST MOD 30 MIN: CPT | Performed by: PHYSICIAN ASSISTANT

## 2022-03-22 RX ORDER — SEMAGLUTIDE 1.34 MG/ML
1 INJECTION, SOLUTION SUBCUTANEOUS WEEKLY
Qty: 9 ML | Refills: 0 | Status: SHIPPED | OUTPATIENT
Start: 2022-03-22 | End: 2022-06-01 | Stop reason: SDUPTHER

## 2022-03-22 NOTE — PROGRESS NOTES
Assessment/Plan:    Obesity, Class II, BMI 35-39 9  -Patient is pursuing Conservative Program  -Initial weight loss goal of 5-10% weight loss for improved health  - on wegovy 1 7mg qw, tolerating well, filled the next 2 dose on the titration       Initial: 265 4 lbs  Current: 262 1 lbs  Change: -3 3 lbs (+7 9 lbs from last visit in dec)  Goal: 220 lbs     Goals:  Food log (ie ) www myfitnesspal com,sparkpeople  com,loseit com,calorieking  com,etc  baritastic  No sugary beverages  At least 64oz of water daily  Increase physical activity by 10 minutes daily  Gradually increase physical activity to a goal of 5 days per week for 30 minutes of MODERATE intensity PLUS 2 days per week of FULL BODY resistance training  2652-4229 calories per day  Decrease bread portions to one serving per day  Serving size out snacks  Need to food log to be aware of calorie consumption   Will switch to ozempic 1 mg weekly since wegovy not covered  Increase water  Exercise-keep up the great work   Consider going to bed earlier   1 slice of bread or just deli roll ups   substitute chips for fruit or veggie     Sleep apnea with use of continuous positive airway pressure (CPAP)  -encouraged continued use of CPAP machine  -may improve with 20-30% weight loss      Hyperlipidemia  Taking lipitor  -should improve with weight loss, dietary, and lifestyle changes  -continue management with prescribing provider          Follow up in approximately 2 months with Non-Surgical Physician/Advanced Practitioner  Diagnoses and all orders for this visit:    Obesity, Class II, BMI 35-39 9  -     semaglutide, 1 mg/dose, (Ozempic, 1 MG/DOSE,) 4 MG/3ML SOPN injection pen; Inject 0 75 mL (1 mg total) under the skin once a week    Sleep apnea with use of continuous positive airway pressure (CPAP)  -     semaglutide, 1 mg/dose, (Ozempic, 1 MG/DOSE,) 4 MG/3ML SOPN injection pen;  Inject 0 75 mL (1 mg total) under the skin once a week    Hyperlipidemia  - semaglutide, 1 mg/dose, (Ozempic, 1 MG/DOSE,) 4 MG/3ML SOPN injection pen; Inject 0 75 mL (1 mg total) under the skin once a week    Prediabetes  -     Hemoglobin A1C; Future  -     semaglutide, 1 mg/dose, (Ozempic, 1 MG/DOSE,) 4 MG/3ML SOPN injection pen; Inject 0 75 mL (1 mg total) under the skin once a week          Subjective:   Chief Complaint   Patient presents with    Follow-up     10-12 WK MWM /FUP        Patient ID: Kristen Harley  is a 78 y o  male with excess weight/obesity here to pursue weight managment  Patient is pursuing Conservative Program      HPI  Notes he has not been doing much of anything for weight loss  Notes night snacking is his hardest  Snacks at 9, 10, and 1130 pm   Food logging:not logging   Exercise: 3 days a week at the gym for 30 minutes for weight training, does goes for walks   Hydration: 3 Glasses of water, 40 oz of diet soda per day, 1 cup of skim milk  10 beers per week   Taking ozempic 1 mg- denies any negative side effects but does not think it is helping his appetite -wants to continue it because of prediabetes     Not interested in more medication at this time  Wants to focus on food logging and more exercise  Does not feel he eats from [employer] but always feels hungry     B: 2 eggs and 2 slices of toast   S: banana  L: turkey sandwich on whole wheat, chips and skim milk   S: Apple and 40 calorie candy and pretzels  D: Protein, starch and veggies   9pm: crackers-not measured and candy  10: katelynn frozen yogurt with peanuts  1130: goldfish- out of bag       The following portions of the patient's history were reviewed and updated as appropriate: allergies, current medications, past family history, past medical history, past social history, past surgical history and problem list     Review of Systems   HENT: Negative for sore throat  Respiratory: Negative for cough and shortness of breath  Cardiovascular: Negative for chest pain and palpitations     Gastrointestinal: Negative for abdominal pain, constipation, diarrhea, nausea and vomiting  Denies GERD   Skin: Negative for rash  Psychiatric/Behavioral: Negative for suicidal ideas  Denies depression and anxiety       Objective:    /78   Pulse 76   Temp 97 8 °F (36 6 °C) (Tympanic)   Ht 5' 11 8" (1 824 m)   Wt 119 kg (262 lb 1 6 oz)   BMI 35 75 kg/m²      Physical Exam  Vitals and nursing note reviewed  Constitutional   General appearance: Abnormal   well developed and morbidly obese  Eyes No conjunctival injection    Pulmonary   Respiratory effort: No increased work of breathing or signs of respiratory distress  Abdomen   Abdomen: Abnormal   The abdomen was obese  Musculoskeletal   Gait and station: Normal     Skin  Dry   No visible rashes   Psychiatric   Orientation to person, place and time: Normal     Affect: appropriate  Neurological   Normal gait

## 2022-03-22 NOTE — ASSESSMENT & PLAN NOTE
-Patient is pursuing Conservative Program  -Initial weight loss goal of 5-10% weight loss for improved health  - on wegovy 1 7mg qw, tolerating well, filled the next 2 dose on the titration       Initial: 265 4 lbs  Current: 262 1 lbs  Change: -3 3 lbs (+7 9 lbs from last visit in dec)  Goal: 220 lbs     Goals:  Food log (ie ) www myfitnesspal com,sparkpeople  com,loseit com,calorieking  com,etc  baritastic  No sugary beverages  At least 64oz of water daily  Increase physical activity by 10 minutes daily   Gradually increase physical activity to a goal of 5 days per week for 30 minutes of MODERATE intensity PLUS 2 days per week of FULL BODY resistance training  8001-5705 calories per day  Decrease bread portions to one serving per day  Serving size out snacks  Need to food log to be aware of calorie consumption   Will switch to ozempic 1 mg weekly since wegovy not covered  Increase water  Exercise-keep up the great work   Consider going to bed earlier   1 slice of bread or just deli roll ups   substitute chips for fruit or veggie

## 2022-06-01 DIAGNOSIS — E78.5 HYPERLIPIDEMIA: ICD-10-CM

## 2022-06-01 DIAGNOSIS — G47.30 SLEEP APNEA WITH USE OF CONTINUOUS POSITIVE AIRWAY PRESSURE (CPAP): ICD-10-CM

## 2022-06-01 DIAGNOSIS — E66.9 OBESITY, CLASS II, BMI 35-39.9: ICD-10-CM

## 2022-06-01 DIAGNOSIS — R73.03 PREDIABETES: ICD-10-CM

## 2022-06-01 RX ORDER — SEMAGLUTIDE 1.34 MG/ML
1 INJECTION, SOLUTION SUBCUTANEOUS WEEKLY
Qty: 9 ML | Refills: 0 | Status: SHIPPED | OUTPATIENT
Start: 2022-06-01

## 2022-06-08 ENCOUNTER — TELEPHONE (OUTPATIENT)
Dept: BARIATRICS | Facility: CLINIC | Age: 67
End: 2022-06-08

## 2022-06-08 LAB — HBA1C MFR BLD: 5.8 % OF TOTAL HGB

## 2022-06-08 NOTE — TELEPHONE ENCOUNTER
----- Message from Ashly Gonzalez, [address] sent at 6/8/2022  7:48 AM EDT -----  Please let the patient know I have reviewed his A1C result and it is elevated in the prediabetic range  He should avoid refined carbohydrates including white bread, rice, pasta, pretzels, chips, pretzels, desserts and sugary beverages  This can improve with weight loss and cardiovascular exercise  He should keep his appointment later this month with Jena Law, [place]

## 2022-06-22 ENCOUNTER — OFFICE VISIT (OUTPATIENT)
Dept: BARIATRICS | Facility: CLINIC | Age: 67
End: 2022-06-22
Payer: COMMERCIAL

## 2022-06-22 VITALS
TEMPERATURE: 98.1 F | HEART RATE: 84 BPM | SYSTOLIC BLOOD PRESSURE: 120 MMHG | BODY MASS INDEX: 37.02 KG/M2 | HEIGHT: 71 IN | WEIGHT: 264.4 LBS | OXYGEN SATURATION: 97 % | DIASTOLIC BLOOD PRESSURE: 68 MMHG

## 2022-06-22 DIAGNOSIS — E66.9 OBESITY, CLASS II, BMI 35-39.9: Primary | ICD-10-CM

## 2022-06-22 DIAGNOSIS — R73.01 IFG (IMPAIRED FASTING GLUCOSE): ICD-10-CM

## 2022-06-22 PROCEDURE — 99214 OFFICE O/P EST MOD 30 MIN: CPT | Performed by: PHYSICIAN ASSISTANT

## 2022-06-22 NOTE — PATIENT INSTRUCTIONS
Can try 647 bread or lite bread or can try wrap such as ashley  Try to limit snacking to once at night

## 2022-06-22 NOTE — ASSESSMENT & PLAN NOTE
-Patient is pursuing Conservative Program  -Initial weight loss goal of 5-10% weight loss for improved health       Initial: 265 4 lbs  Current: 264 4lbs  Change: -1 lbs   Goal: 220 lbs     Goals:  -Food log (ie ) www myfitnesspal com,sparkpeople  com,loseit com,calorieking  com,etc --3604-4361 calories per day   Has not food logged yet and not likely too  Discussed reducing bread portion and switching to lower calorie bread or a wrap  Try to reduce to 1 snack only at night and measure out portion  -No sugary beverages  At least 64oz of water daily   -Increase physical activity by 10 minutes daily  Gradually increase physical activity to a goal of 5 days per week for 30 minutes of MODERATE intensity PLUS 2 days per week of FULL BODY resistance training-continue physical activity    Continue ozempic 1mg   We did discuss 2mg dose but having constipation and he feels he needs to work on his diet

## 2022-06-22 NOTE — PROGRESS NOTES
Assessment/Plan:    Obesity, Class II, BMI 35-39 9  -Patient is pursuing Conservative Program  -Initial weight loss goal of 5-10% weight loss for improved health       Initial: 265 4 lbs  Current: 264 4lbs  Change: -1 lbs   Goal: 220 lbs     Goals:  -Food log (ie ) www myfitnesspal com,sparkpeople  com,loseit com,calorieking  com,etc --3992-8840 calories per day   Has not food logged yet and not likely too  Discussed reducing bread portion and switching to lower calorie bread or a wrap  Try to reduce to 1 snack only at night and measure out portion  -No sugary beverages  At least 64oz of water daily   -Increase physical activity by 10 minutes daily  Gradually increase physical activity to a goal of 5 days per week for 30 minutes of MODERATE intensity PLUS 2 days per week of FULL BODY resistance training-continue physical activity    Continue ozempic 1mg  We did discuss 2mg dose but having constipation and he feels he needs to work on his diet    IFG (impaired fasting glucose)  Continue ozempic as it has helped glycemic control  declined increased dose for weight due to constipation  Continue otc miralax        Follow up in approximately 3 months with Non-Surgical Physician/Advanced Practitioner  Diagnoses and all orders for this visit:    Obesity, Class II, BMI 35-39 9    IFG (impaired fasting glucose)          Subjective:   Chief Complaint   Patient presents with    Follow-up     MWM 3 mo f/u, waist 49        Patient ID: Tab Weeks  is a 75 y o  male with excess weight/obesity here to pursue weight managment  Patient is pursuing Conservative Program      HPI  Here for MWM follow up   He was on wegovy 1 7mg and it was helping more but his insurance would not cover it  He is not feeling as much appetite control with ozempic but it has improved his A1C so he would like to continue it  He has not been actively pursuing weight loss at this time     Wt Readings from Last 10 Encounters:   06/22/22 120 kg (264 lb 6 4 oz)   03/22/22 119 kg (262 lb 1 6 oz)   01/06/22 117 kg (258 lb 9 6 oz)   12/28/21 115 kg (254 lb 3 2 oz)   11/18/21 116 kg (256 lb)   10/28/21 117 kg (256 lb 14 4 oz)   10/05/21 118 kg (259 lb 12 8 oz)   09/10/21 119 kg (262 lb 3 2 oz)   08/27/21 118 kg (259 lb 11 2 oz)   08/24/21 120 kg (264 lb)       Food logging:none, has not tried it  Increased appetite/cravings:  Fruit/Vegetable servings:  Exercise:wal;sander and racketball, gym 3 times a week  Hydration:4 glasses a day,      Diet recall  B 2 eggs and toast  L:tureky sandwich and milk  S: 1/2 sandwich PB  D: bread, veggie, protein  S:1 PB cup dark chocolate and crackers, frozen yogurt and peants, fish cracker  Colonoscopy-scheduled in august    The following portions of the patient's history were reviewed and updated as appropriate:   He  has a past medical history of LILY (obstructive sleep apnea), Plantar wart, and Verruca (1972)  He   Patient Active Problem List    Diagnosis Date Noted    Hyperlipidemia 12/28/2021    IFG (impaired fasting glucose) 08/02/2021    Obesity, Class II, BMI 35-39 9 07/29/2021    Sleep apnea with use of continuous positive airway pressure (CPAP) 07/01/2011     He  has a past surgical history that includes Toenail excision (2011) and Tonsillectomy (1959)  His family history is not on file  He  reports that he quit smoking about 3 years ago  His smoking use included cigarettes  He started smoking about 52 years ago  He has a 72 00 pack-year smoking history  He has never used smokeless tobacco  He reports current alcohol use of about 10 0 standard drinks of alcohol per week  He reports that he does not use drugs    Current Outpatient Medications   Medication Sig Dispense Refill    atorvastatin (LIPITOR) 10 mg tablet       semaglutide, 1 mg/dose, (Ozempic, 1 MG/DOSE,) 4 MG/3ML SOPN injection pen Inject 0 75 mL (1 mg total) under the skin once a week 9 mL 0    Omega-3 Fatty Acids (fish oil) 1,000 mg Take by mouth       No current facility-administered medications for this visit  Current Outpatient Medications on File Prior to Visit   Medication Sig    atorvastatin (LIPITOR) 10 mg tablet     semaglutide, 1 mg/dose, (Ozempic, 1 MG/DOSE,) 4 MG/3ML SOPN injection pen Inject 0 75 mL (1 mg total) under the skin once a week    Omega-3 Fatty Acids (fish oil) 1,000 mg Take by mouth     No current facility-administered medications on file prior to visit       Review of Systems   Constitutional: Negative for chills and fever  Eyes: Negative for pain and visual disturbance  Respiratory: Negative for cough and shortness of breath  Cardiovascular: Negative for chest pain and palpitations  Gastrointestinal: Positive for constipation  Negative for abdominal pain and vomiting  Genitourinary: Negative for dysuria and hematuria  Skin: Negative for color change and rash  Neurological: Negative for seizures and syncope  Psychiatric/Behavioral: Negative for dysphoric mood  The patient is not nervous/anxious  All other systems reviewed and are negative  Objective:    /68 (BP Location: Left arm, Patient Position: Sitting, Cuff Size: Standard)   Pulse 84   Temp 98 1 °F (36 7 °C) (Tympanic)   Ht 5' 11" (1 803 m)   Wt 120 kg (264 lb 6 4 oz)   SpO2 97%   BMI 36 88 kg/m²      Physical Exam  Vitals and nursing note reviewed  Constitutional:       General: He is not in acute distress  Appearance: He is well-developed  He is obese  HENT:      Head: Normocephalic and atraumatic  Eyes:      Conjunctiva/sclera: Conjunctivae normal    Neck:      Thyroid: No thyromegaly  Pulmonary:      Effort: Pulmonary effort is normal  No respiratory distress  Skin:     Findings: No rash (visible)  Neurological:      Mental Status: He is alert and oriented to person, place, and time     Psychiatric:         Behavior: Behavior normal

## 2022-09-27 ENCOUNTER — OFFICE VISIT (OUTPATIENT)
Dept: BARIATRICS | Facility: CLINIC | Age: 67
End: 2022-09-27
Payer: COMMERCIAL

## 2022-09-27 ENCOUNTER — TELEPHONE (OUTPATIENT)
Dept: ADMINISTRATIVE | Facility: OTHER | Age: 67
End: 2022-09-27

## 2022-09-27 VITALS
SYSTOLIC BLOOD PRESSURE: 120 MMHG | DIASTOLIC BLOOD PRESSURE: 70 MMHG | HEIGHT: 71 IN | HEART RATE: 81 BPM | WEIGHT: 264.7 LBS | RESPIRATION RATE: 14 BRPM | BODY MASS INDEX: 37.06 KG/M2 | OXYGEN SATURATION: 98 %

## 2022-09-27 DIAGNOSIS — E78.5 HYPERLIPIDEMIA: ICD-10-CM

## 2022-09-27 DIAGNOSIS — R73.01 IFG (IMPAIRED FASTING GLUCOSE): ICD-10-CM

## 2022-09-27 DIAGNOSIS — E66.9 OBESITY, CLASS II, BMI 35-39.9: Primary | ICD-10-CM

## 2022-09-27 DIAGNOSIS — G47.30 SLEEP APNEA WITH USE OF CONTINUOUS POSITIVE AIRWAY PRESSURE (CPAP): ICD-10-CM

## 2022-09-27 DIAGNOSIS — R73.03 PREDIABETES: ICD-10-CM

## 2022-09-27 PROCEDURE — 99214 OFFICE O/P EST MOD 30 MIN: CPT | Performed by: PHYSICIAN ASSISTANT

## 2022-09-27 NOTE — TELEPHONE ENCOUNTER
----- [id number] 9[facility]27[facility]2022 10:56 AM EDT -----  Regarding: colonoscopy  Hello, patient just had colonoscopy last month at [facility] if records can be updated to reflect that    Thanks

## 2022-09-27 NOTE — TELEPHONE ENCOUNTER
Upon review of the In Basket request we were able to locate, review, and update the patient chart as requested for CRC: Colonoscopy  Any additional questions or concerns should be emailed to the Practice Liaisons via [email or web address] email, please do not reply via In Basket      Thank you  Josep Moser MA

## 2022-09-27 NOTE — PROGRESS NOTES
Assessment/Plan:    Obesity, Class II, BMI 35-39 9  -Patient is pursuing Conservative Program  -Initial weight loss goal of 5-10% weight loss for improved health  -NOT interested in weight loss or meal replacements  Discussed healthycore or dietician visits but he feels he needs to become more self motivated    Initial: 265 4 lbs  Current: 264 9lbs  Change: -0 6lbs   Goal: 220 lbs     Goals:  -Try to reduce to 1 snack only at night and measure out portion  Discussed changing evening routine-go for a walk, choose a hobby  After having snack recommend questioning if really hungry and making sure well hydrated  -will start writing down food on paper  -No sugary beverages  At least 64oz of water daily   -continue current exercise routine  -Goal 6 lb weight loss by next office visit    Continue ozempic 1mg  He feels medication helps some and has improved blood sugar however he does need to work more on lifestyle changes  IFG (impaired fasting glucose)  On ozempic and labs have improved    Sleep apnea with use of continuous positive airway pressure (CPAP)  -encouraged continued use of CPAP machine  -may improve with 20-30% weight loss          Follow up in approximately 3 months with Non-Surgical Physician/Advanced Practitioner  Diagnoses and all orders for this visit:    Obesity, Class II, BMI 35-39 9    Sleep apnea with use of continuous positive airway pressure (CPAP)    Hyperlipidemia    Prediabetes    IFG (impaired fasting glucose)          Subjective:   Chief Complaint   Patient presents with    Follow-up     MWM 3 mth fu         Patient ID: Mona Clark  is a 75 y o  male with excess weight/obesity here to pursue weight managment  Patient is pursuing Conservative Program      HPI  Here for MWM followup   He was initially on wegovy but could not continue due to supply issue  Then swtiching to 1mg of ozempic  The medication has reduced his blood sugar and A1C   A1C has gone to 5 3 and fasting blood sugar is now normal where it was typically between 100-110  He feels he is lacking motivation at this time  Exercise is the same and he has switched to lower calorie bread however he does too much snacking    He has been traveling which has thrown off his diet      Wt Readings from Last 10 Encounters:   09/27/22 120 kg (264 lb 11 2 oz)   06/22/22 120 kg (264 lb 6 4 oz)   03/22/22 119 kg (262 lb 1 6 oz)   01/06/22 117 kg (258 lb 9 6 oz)   12/28/21 115 kg (254 lb 3 2 oz)   11/18/21 116 kg (256 lb)   10/28/21 117 kg (256 lb 14 4 oz)   10/05/21 118 kg (259 lb 12 8 oz)   09/10/21 119 kg (262 lb 3 2 oz)   08/27/21 118 kg (259 lb 11 2 oz)       Food logging:no  Increased appetite/cravings:no change from baseline  Fruit/Vegetable servings:  Exercise:3 times a week gym and weight training  Walking often  Hydration:64 oz a day min   water    Colonoscopy-Completed    The following portions of the patient's history were reviewed and updated as appropriate:   He  has a past medical history of LILY (obstructive sleep apnea), Plantar wart, and Vereverca (1972)  He   Patient Active Problem List    Diagnosis Date Noted    Hyperlipidemia 12/28/2021    IFG (impaired fasting glucose) 08/02/2021    Obesity, Class II, BMI 35-39 9 07/29/2021    Sleep apnea with use of continuous positive airway pressure (CPAP) 07/01/2011     He  has a past surgical history that includes Toenail excision (2011) and Tonsillectomy (1959)  His family history is not on file  He  reports that he quit smoking about 4 years ago  His smoking use included cigarettes  He started smoking about 52 years ago  He has a 72 00 pack-year smoking history  He has never used smokeless tobacco  He reports current alcohol use of about 10 0 standard drinks of alcohol per week  He reports that he does not use drugs    Current Outpatient Medications   Medication Sig Dispense Refill    atorvastatin (LIPITOR) 10 mg tablet       Ozempic, 1 MG/DOSE, 4 MG/3ML SOPN injection pen INJECT 0 75 ML (1 MG TOTAL) UNDER THE SKIN ONCE A WEEK 9 mL 0     No current facility-administered medications for this visit  Current Outpatient Medications on File Prior to Visit   Medication Sig    atorvastatin (LIPITOR) 10 mg tablet     Ozempic, 1 MG/DOSE, 4 MG/3ML SOPN injection pen INJECT 0 75 ML (1 MG TOTAL) UNDER THE SKIN ONCE A WEEK    [DISCONTINUED] Omega-3 Fatty Acids (fish oil) 1,000 mg Take by mouth     No current facility-administered medications on file prior to visit  He has No Known Allergies       Review of Systems   Constitutional: Negative for fever  Respiratory: Negative for shortness of breath  Cardiovascular: Negative for chest pain and palpitations  Gastrointestinal: Negative for abdominal pain, constipation and diarrhea  Endocrine: Negative for cold intolerance and heat intolerance  Genitourinary: Negative for difficulty urinating  Musculoskeletal: Negative for arthralgias and back pain  Skin: Negative for rash  Neurological: Negative for headaches  Psychiatric/Behavioral: Negative for dysphoric mood  The patient is not nervous/anxious  Objective:    /70 (BP Location: Left arm, Patient Position: Sitting, Cuff Size: Standard)   Pulse 81   Resp 14   Ht 5' 11" (1 803 m)   Wt 120 kg (264 lb 11 2 oz)   SpO2 98%   BMI 36 92 kg/m²      Physical Exam  Vitals and nursing note reviewed  Constitutional:       General: He is not in acute distress  Appearance: He is well-developed  He is obese  HENT:      Head: Normocephalic and atraumatic  Eyes:      Conjunctiva/sclera: Conjunctivae normal    Neck:      Thyroid: No thyromegaly  Pulmonary:      Effort: Pulmonary effort is normal  No respiratory distress  Skin:     Findings: No rash (visible)  Neurological:      Mental Status: He is alert and oriented to person, place, and time     Psychiatric:         Mood and Affect: Mood normal          Behavior: Behavior normal

## 2022-09-27 NOTE — ASSESSMENT & PLAN NOTE
-Patient is pursuing Conservative Program  -Initial weight loss goal of 5-10% weight loss for improved health  -NOT interested in weight loss or meal replacements  Discussed healthycore or dietician visits but he feels he needs to become more self motivated    Initial: 265 4 lbs  Current: 264 9lbs  Change: -0 6lbs   Goal: 220 lbs     Goals:  -Try to reduce to 1 snack only at night and measure out portion  Discussed changing evening routine-go for a walk, choose a hobby  After having snack recommend questioning if really hungry and making sure well hydrated  -will start writing down food on paper  -No sugary beverages  At least 64oz of water daily   -continue current exercise routine  -Goal 6 lb weight loss by next office visit    Continue ozempic 1mg  He feels medication helps some and has improved blood sugar however he does need to work more on lifestyle changes

## 2022-11-16 DIAGNOSIS — E78.5 HYPERLIPIDEMIA: ICD-10-CM

## 2022-11-16 DIAGNOSIS — R73.03 PREDIABETES: ICD-10-CM

## 2022-11-16 DIAGNOSIS — E66.9 OBESITY, CLASS II, BMI 35-39.9: ICD-10-CM

## 2022-11-16 DIAGNOSIS — G47.30 SLEEP APNEA WITH USE OF CONTINUOUS POSITIVE AIRWAY PRESSURE (CPAP): ICD-10-CM

## 2022-11-16 RX ORDER — SEMAGLUTIDE 1.34 MG/ML
1 INJECTION, SOLUTION SUBCUTANEOUS WEEKLY
Qty: 9 ML | Refills: 0 | Status: SHIPPED | OUTPATIENT
Start: 2022-11-16 | End: 2023-01-31

## 2023-01-31 ENCOUNTER — OFFICE VISIT (OUTPATIENT)
Dept: BARIATRICS | Facility: CLINIC | Age: 68
End: 2023-01-31

## 2023-01-31 VITALS
SYSTOLIC BLOOD PRESSURE: 120 MMHG | WEIGHT: 264 LBS | DIASTOLIC BLOOD PRESSURE: 64 MMHG | RESPIRATION RATE: 16 BRPM | BODY MASS INDEX: 36.96 KG/M2 | HEIGHT: 71 IN | HEART RATE: 82 BPM

## 2023-01-31 DIAGNOSIS — E66.9 OBESITY, CLASS II, BMI 35-39.9: Primary | ICD-10-CM

## 2023-01-31 DIAGNOSIS — R73.03 PREDIABETES: ICD-10-CM

## 2023-01-31 DIAGNOSIS — G47.30 SLEEP APNEA WITH USE OF CONTINUOUS POSITIVE AIRWAY PRESSURE (CPAP): ICD-10-CM

## 2023-01-31 DIAGNOSIS — R73.01 IFG (IMPAIRED FASTING GLUCOSE): ICD-10-CM

## 2023-01-31 RX ORDER — CLOBETASOL PROPIONATE 0.05 MG/G
1 GEL TOPICAL 2 TIMES DAILY PRN
COMMUNITY
Start: 2022-11-28

## 2023-01-31 RX ORDER — TACROLIMUS 1 MG/1
CAPSULE ORAL
COMMUNITY
Start: 2022-11-28

## 2023-01-31 RX ORDER — METFORMIN HYDROCHLORIDE 750 MG/1
750 TABLET, EXTENDED RELEASE ORAL DAILY
Qty: 30 TABLET | Refills: 2 | Status: SHIPPED | OUTPATIENT
Start: 2023-01-31

## 2023-01-31 NOTE — PROGRESS NOTES
Assessment/Plan:    Obesity, Class II, BMI 35-39 9  -Patient is pursuing Conservative Program  -Initial weight loss goal of 5-10% weight loss   -slight interested in weight loss surgery and he will view informational video and discuss further  - Discussed healthycore or dietician visits but he feels he needs to become more self motivated    Initial: 265 4 lbs  Current: 264 lbs  Change: -1 4lbs   Goal: 220 lbs     Goals:  -Try to reduce to 1 snack only at night and measure out portion  Discussed changing evening routine-go for a walk, choose a hobby  Discussed measuring out a portion controlled snack at night  After having snack recommend questioning if really hungry and making sure well hydrated  -will start writing down food on paper  -No sugary beverages  At least 64oz of water daily   -continue current exercise routine    To d/c ozempic due to no change in weight and constipation  He did have improvements in FBS while on it and A1C has been stable at 5 7    To start metformin  Discussed side effects    IFG (impaired fasting glucose)  To stop ozempic and start metformin  a1c 5 7     Sleep apnea with use of continuous positive airway pressure (CPAP)  -encouraged continued use of CPAP machine  -may improve with 20-30% weight loss          Follow up in approximately 3 months with Non-Surgical Physician/Advanced Practitioner  Diagnoses and all orders for this visit:    Obesity, Class II, BMI 35-39 9  -     metFORMIN (GLUCOPHAGE-XR) 750 mg 24 hr tablet; Take 1 tablet (750 mg total) by mouth daily    Prediabetes  -     metFORMIN (GLUCOPHAGE-XR) 750 mg 24 hr tablet; Take 1 tablet (750 mg total) by mouth daily    IFG (impaired fasting glucose)    Sleep apnea with use of continuous positive airway pressure (CPAP)    Other orders  -     tacrolimus (PROGRAF) 1 mg capsule; DISSOLVE IN 16 OZ OF WATER AND SWISH AND SPIT ONE TEASPOON TWICE DAILY  -     clobetasol (TEMOVATE) 0 05 % GEL;  Apply 1 application topically 2 (two) times a day as needed To affected area          Subjective:   Chief Complaint   Patient presents with   • Follow-up     MWM 3mth f/u; waist 48in        Patient ID: Shyanne Trinh  is a 75 y o  male with excess weight/obesity here to pursue weight managment  Patient is pursuing Conservative Program      HPI  Here for MWM followup  He was initially on wegovy and titrated to 1 7mg and then switched to ozempic due to insurance coverage  He does feel it is helping appettie however he has not made any progress with weight loss  He is back to having problems with constipation  He is taking     He is having issues with his gums and they think it could be lichen planus  It started   Wt Readings from Last 10 Encounters:   01/31/23 120 kg (264 lb)   09/27/22 120 kg (264 lb 11 2 oz)   06/22/22 120 kg (264 lb 6 4 oz)   03/22/22 119 kg (262 lb 1 6 oz)   01/06/22 117 kg (258 lb 9 6 oz)   12/28/21 115 kg (254 lb 3 2 oz)   11/18/21 116 kg (256 lb)   10/28/21 117 kg (256 lb 14 4 oz)   10/05/21 118 kg (259 lb 12 8 oz)   09/10/21 119 kg (262 lb 3 2 oz)       Food logging:no   Increased appetite/cravings:no change from baseline , does feel hungry often   Usually more hungry at night  Fruit/Vegetable servings:   Exercise:3 times a week gym and weight training  Walking often 45 minutes at least day  Hydration:64 oz a day min  Water, 2 soda diet       Colonoscopy-Completed    The following portions of the patient's history were reviewed and updated as appropriate: He  has a past medical history of LILY (obstructive sleep apnea), Plantar wart, and Verruca (1972)  He   Patient Active Problem List    Diagnosis Date Noted   • Hyperlipidemia 12/28/2021   • IFG (impaired fasting glucose) 08/02/2021   • Obesity, Class II, BMI 35-39 9 07/29/2021   • Sleep apnea with use of continuous positive airway pressure (CPAP) 07/01/2011     He  has a past surgical history that includes Toenail excision (2011) and Tonsillectomy (1959)    His family history is not on file  He  reports that he quit smoking about 4 years ago  His smoking use included cigarettes  He started smoking about 52 years ago  He has a 72 00 pack-year smoking history  He has never used smokeless tobacco  He reports current alcohol use of about 10 0 standard drinks per week  He reports that he does not use drugs  Current Outpatient Medications   Medication Sig Dispense Refill   • atorvastatin (LIPITOR) 10 mg tablet      • clobetasol (TEMOVATE) 0 05 % GEL Apply 1 application topically 2 (two) times a day as needed To affected area     • metFORMIN (GLUCOPHAGE-XR) 750 mg 24 hr tablet Take 1 tablet (750 mg total) by mouth daily 30 tablet 2   • tacrolimus (PROGRAF) 1 mg capsule DISSOLVE IN 16 OZ OF WATER AND SWISH AND SPIT ONE TEASPOON TWICE DAILY       No current facility-administered medications for this visit  Current Outpatient Medications on File Prior to Visit   Medication Sig   • atorvastatin (LIPITOR) 10 mg tablet    • clobetasol (TEMOVATE) 0 05 % GEL Apply 1 application topically 2 (two) times a day as needed To affected area   • tacrolimus (PROGRAF) 1 mg capsule DISSOLVE IN 16 OZ OF WATER AND SWISH AND SPIT ONE TEASPOON TWICE DAILY   • [DISCONTINUED] semaglutide, 1 mg/dose, (Ozempic, 1 MG/DOSE,) 4 MG/3ML SOPN injection pen Inject 0 75 mL (1 mg total) under the skin once a week     No current facility-administered medications on file prior to visit  He has No Known Allergies       Review of Systems   Constitutional: Negative for fatigue  Respiratory: Negative for shortness of breath  Cardiovascular: Negative for chest pain and palpitations  Gastrointestinal: Positive for constipation  Negative for abdominal pain and diarrhea  Endocrine: Negative for cold intolerance and heat intolerance  Genitourinary: Negative for difficulty urinating  Musculoskeletal: Negative for arthralgias and back pain  Skin: Negative for rash  Neurological: Negative for headaches  Psychiatric/Behavioral: Negative for dysphoric mood  The patient is not nervous/anxious  Objective:    /64   Pulse 82   Resp 16   Ht 5' 11" (1 803 m)   Wt 120 kg (264 lb)   BMI 36 82 kg/m²      Physical Exam  Vitals and nursing note reviewed  Constitutional:       General: He is not in acute distress  Appearance: He is well-developed  He is obese  HENT:      Head: Normocephalic and atraumatic  Eyes:      Conjunctiva/sclera: Conjunctivae normal    Neck:      Thyroid: No thyromegaly  Pulmonary:      Effort: Pulmonary effort is normal  No respiratory distress  Skin:     Findings: No rash (visible)  Neurological:      Mental Status: He is alert and oriented to person, place, and time     Psychiatric:         Mood and Affect: Mood normal          Behavior: Behavior normal

## 2023-01-31 NOTE — ASSESSMENT & PLAN NOTE
-Patient is pursuing Conservative Program  -Initial weight loss goal of 5-10% weight loss   -slight interested in weight loss surgery and he will view informational video and discuss further  - Discussed healthycore or dietician visits but he feels he needs to become more self motivated    Initial: 265 4 lbs  Current: 264 lbs  Change: -1 4lbs   Goal: 220 lbs     Goals:  -Try to reduce to 1 snack only at night and measure out portion  Discussed changing evening routine-go for a walk, choose a hobby  Discussed measuring out a portion controlled snack at night  After having snack recommend questioning if really hungry and making sure well hydrated  -will start writing down food on paper  -No sugary beverages  At least 64oz of water daily   -continue current exercise routine    To d/c ozempic due to no change in weight and constipation  He did have improvements in FBS while on it and A1C has been stable at 5 7    To start metformin   Discussed side effects

## 2023-05-16 ENCOUNTER — OFFICE VISIT (OUTPATIENT)
Dept: BARIATRICS | Facility: CLINIC | Age: 68
End: 2023-05-16

## 2023-05-16 VITALS
SYSTOLIC BLOOD PRESSURE: 125 MMHG | DIASTOLIC BLOOD PRESSURE: 60 MMHG | WEIGHT: 262.4 LBS | RESPIRATION RATE: 16 BRPM | BODY MASS INDEX: 36.73 KG/M2 | HEART RATE: 69 BPM | HEIGHT: 71 IN

## 2023-05-16 DIAGNOSIS — E78.5 HYPERLIPIDEMIA: ICD-10-CM

## 2023-05-16 DIAGNOSIS — R73.01 IFG (IMPAIRED FASTING GLUCOSE): ICD-10-CM

## 2023-05-16 DIAGNOSIS — G47.30 SLEEP APNEA WITH USE OF CONTINUOUS POSITIVE AIRWAY PRESSURE (CPAP): ICD-10-CM

## 2023-05-16 DIAGNOSIS — R73.03 PREDIABETES: ICD-10-CM

## 2023-05-16 DIAGNOSIS — E66.9 OBESITY, CLASS II, BMI 35-39.9: Primary | ICD-10-CM

## 2023-05-16 RX ORDER — COVID-19 MOLECULAR TEST ASSAY
KIT MISCELLANEOUS
COMMUNITY
Start: 2023-02-06

## 2023-05-16 RX ORDER — METFORMIN HYDROCHLORIDE 750 MG/1
1500 TABLET, EXTENDED RELEASE ORAL
Qty: 180 TABLET | Refills: 0 | Status: SHIPPED | OUTPATIENT
Start: 2023-05-16

## 2023-05-16 NOTE — ASSESSMENT & PLAN NOTE
Increased dose of metformin extended release to 1500 mg daily  Patient will be checking a hemoglobin A1c now and repeat in 3 months, prior to follow-up appointment

## 2023-05-16 NOTE — PROGRESS NOTES
Assessment/Plan:  Aurora Stacy was seen today for follow-up  Diagnoses and all orders for this visit:    Obesity, Class II, BMI 35-39 9  -     HEMOGLOBIN A1C W/ EAG ESTIMATION; Future  -     metFORMIN (GLUCOPHAGE-XR) 750 mg 24 hr tablet; Take 2 tablets (1,500 mg total) by mouth daily with breakfast  -     HEMOGLOBIN A1C W/ EAG ESTIMATION; Future    Prediabetes  -     metFORMIN (GLUCOPHAGE-XR) 750 mg 24 hr tablet; Take 2 tablets (1,500 mg total) by mouth daily with breakfast  -     HEMOGLOBIN A1C W/ EAG ESTIMATION; Future  -     HEMOGLOBIN A1C W/ EAG ESTIMATION; Future    Hyperlipidemia    Sleep apnea with use of continuous positive airway pressure (CPAP)    IFG (impaired fasting glucose)       Sleep apnea with use of continuous positive airway pressure (CPAP)  Compliant with use of CPAP  Prediabetes  Increased dose of metformin extended release to 1500 mg daily  Patient will be checking a hemoglobin A1c now and repeat in 3 months, prior to follow-up appointment  Obesity, Class II, BMI 35-39 9  Increase metforming ER to 1500mg daily  Have A1C done tomorrow  Repeat A1C prior to follow-up in August     Myfitnesspal or Lose it speedy to food log  Aim for 1800 calories a day  DO THIS EVERY SINGLE DAY FOR 2 WEEKS, THEN JUST 1-2 DAYS A WEEK  Follow-up in 3 months  We may consider adding topiramate at that time if needed  ______________________________________________________________________    Subjective:   Chief Complaint   Patient presents with   • Follow-up     MWM 3mth f/u; Waist-49 5in     Patient here to discuss weight associated problems and nutrition goals  HPI: Darshan Sousa  is a 75 y o  male with history of prediabetes, LILY, and excess weight  She was initially on Wegovy which was titrated up to 1 7 mg weekly  This was then switched to Ozempic due to change in insurance formulary  He had constipation, GERD as a side effect  Also, not effective for weight loss    Ozempic was stopped and he "was started on metformin the last appointment on 1/31/23  Weight loss plan:  Conservative Program    Most recent notes and records were reviewed  Wt Readings from Last 10 Encounters:   05/16/23 119 kg (262 lb 6 4 oz)   01/31/23 120 kg (264 lb)   09/27/22 120 kg (264 lb 11 2 oz)   06/22/22 120 kg (264 lb 6 4 oz)   03/22/22 119 kg (262 lb 1 6 oz)   01/06/22 117 kg (258 lb 9 6 oz)   12/28/21 115 kg (254 lb 3 2 oz)   11/18/21 116 kg (256 lb)   10/28/21 117 kg (256 lb 14 4 oz)   10/05/21 118 kg (259 lb 12 8 oz)     Patient denies personal and family history of  pancreatitis, thyroid cancer, MEN-2 tumors  Denies any hx of glaucoma, seizures, kidney stones, gallstones  Denies Hx of CAD, PAD, palpitations, arrhythmia  Denies uncontrolled anxiety or depression, suicidal ideation or     Review Of Systems:  Review of Systems   Constitutional: Negative for activity change, appetite change, chills, fatigue and fever  HENT: Negative for trouble swallowing  Respiratory: Negative for cough and shortness of breath  Cardiovascular: Negative for chest pain, palpitations and leg swelling  Gastrointestinal: Negative for abdominal pain, constipation, diarrhea, nausea and vomiting  Endocrine: Negative for cold intolerance and heat intolerance  Genitourinary: Negative for difficulty urinating and dysuria  Musculoskeletal: Negative for arthralgias, back pain, gait problem and myalgias  Skin: Negative for pallor and rash  Neurological: Negative for headaches  Psychiatric/Behavioral: Negative for dysphoric mood and suicidal ideas  The patient is not nervous/anxious  Objective:  /60   Pulse 69   Resp 16   Ht 5' 11\" (1 803 m)   Wt 119 kg (262 lb 6 4 oz)   BMI 36 60 kg/m²   Physical Exam  Constitutional:       Appearance: Normal appearance  Pulmonary:      Effort: Pulmonary effort is normal  No respiratory distress  Neurological:      Mental Status: He is alert     Psychiatric:         Mood and " Affect: Mood normal        Labs and Imaging  Recent labs and imaging have been personally reviewed  Lab Results   Component Value Date    HGBA1C 5 8 (H) 06/07/2022     Patient reports having a lipid panel done through his employer Fall 2022  He recalls the values being within normal range    He is agreeable to bring in these results to the next appointment for review and scanned into our EMR

## 2023-05-16 NOTE — PATIENT INSTRUCTIONS
Increase metforming ER to 1500mg daily  Have A1C done tomorrow  Repeat A1C prior to follow-up in August     Aura or Lose it speedy to food log  Aim for 1800 calories a day  DO THIS EVERY SINGLE DAY FOR 2 WEEKS, THEN JUST 1-2 DAYS A WEEK  Follow-up in 3 months  We may consider adding topiramate at that time if needed

## 2023-05-17 LAB
EST. AVERAGE GLUCOSE BLD GHB EST-MCNC: 128 MG/DL
EST. AVERAGE GLUCOSE BLD GHB EST-SCNC: 7.1 MMOL/L
HBA1C MFR BLD: 6.1 % OF TOTAL HGB

## 2023-08-13 DIAGNOSIS — E66.9 OBESITY, CLASS II, BMI 35-39.9: ICD-10-CM

## 2023-08-13 DIAGNOSIS — R73.03 PREDIABETES: ICD-10-CM

## 2023-08-14 RX ORDER — METFORMIN HYDROCHLORIDE 750 MG/1
TABLET, EXTENDED RELEASE ORAL
Qty: 180 TABLET | Refills: 0 | Status: SHIPPED | OUTPATIENT
Start: 2023-08-14

## 2023-08-22 ENCOUNTER — TELEPHONE (OUTPATIENT)
Dept: BARIATRICS | Facility: CLINIC | Age: 68
End: 2023-08-22

## 2023-08-22 LAB
EST. AVERAGE GLUCOSE BLD GHB EST-MCNC: 123 MG/DL
EST. AVERAGE GLUCOSE BLD GHB EST-SCNC: 6.8 MMOL/L
HBA1C MFR BLD: 5.9 % OF TOTAL HGB

## 2023-08-22 NOTE — TELEPHONE ENCOUNTER
----- Message from [address] [facility], DO sent at 8/22/2023 12:26 PM EDT -----  We will discuss this at the appointment next week.

## 2023-08-30 ENCOUNTER — OFFICE VISIT (OUTPATIENT)
Dept: BARIATRICS | Facility: CLINIC | Age: 68
End: 2023-08-30
Payer: COMMERCIAL

## 2023-08-30 VITALS
DIASTOLIC BLOOD PRESSURE: 60 MMHG | RESPIRATION RATE: 17 BRPM | HEIGHT: 72 IN | SYSTOLIC BLOOD PRESSURE: 121 MMHG | WEIGHT: 265 LBS | BODY MASS INDEX: 35.89 KG/M2 | HEART RATE: 70 BPM

## 2023-08-30 DIAGNOSIS — R73.03 PREDIABETES: ICD-10-CM

## 2023-08-30 DIAGNOSIS — E78.5 HYPERLIPIDEMIA: ICD-10-CM

## 2023-08-30 DIAGNOSIS — G47.30 SLEEP APNEA WITH USE OF CONTINUOUS POSITIVE AIRWAY PRESSURE (CPAP): ICD-10-CM

## 2023-08-30 DIAGNOSIS — E66.9 OBESITY, CLASS II, BMI 35-39.9: Primary | ICD-10-CM

## 2023-08-30 PROCEDURE — 99214 OFFICE O/P EST MOD 30 MIN: CPT | Performed by: INTERNAL MEDICINE

## 2023-08-30 RX ORDER — TOPIRAMATE 50 MG/1
50 TABLET, FILM COATED ORAL EVERY EVENING
Qty: 90 TABLET | Refills: 0 | Status: SHIPPED | OUTPATIENT
Start: 2023-08-30

## 2023-08-30 RX ORDER — FLUOCINONIDE GEL 0.5 MG/G
GEL TOPICAL 2 TIMES DAILY
COMMUNITY
Start: 2023-08-01

## 2023-08-30 NOTE — PROGRESS NOTES
Assessment/Plan:  Collene Heimlich was seen today for follow-up. Diagnoses and all orders for this visit:    Obesity, Class II, BMI 35-39.9  -     topiramate (Topamax) 50 MG tablet; Take 1 tablet (50 mg total) by mouth every evening    Prediabetes    Hyperlipidemia    Sleep apnea with use of continuous positive airway pressure (CPAP)       Sleep apnea with use of continuous positive airway pressure (CPAP)  Compliant with CPAP    Prediabetes  Reluctant to increase dose due to lose stool on occasion. Tolerable currently. May consider after being on the current dose longer. Obesity, Class II, BMI 35-39.9  No coverage for GLP1 RA for weight loss with insurance. Discussed various generic AOM options. Agreeable to proceed with topiramate which may help cravings and after dinner eating/snacking. General Recommendations:  Nutrition:  Eat breakfast daily. Do not skip meals. Food log (ie.) www.IMANIN.com, sparkpeople. com, loseit.com, calorieking. com, etc.    Practice mindful eating. Be sure to set aside time to eat, eat slowly, and savor your food. Hydration: At least 64oz of water daily. No sugar sweetened beverages. No juice (eat the fruit instead). Exercise:  Studies have shown that the ideal exercise goal is somewhere between 150 to 300 minutes of moderate intensity exercise a week. Start with exercising 10 minutes every other day and gradually increase physical activity with a goal of at least 150 minutes of moderate intensity exercise a week, divided over at least 3 days a week. An example of this would be exercising 30 minutes a day, 5 days a week. Resistance training can increase muscle mass and increase our resting metabolic rate. FULL BODY resistance training is recommended 2-3 times a week. Do not do this on consecutive days to allow for muscle recovery. Aim for a bare minimum 5000 steps, even on days you do not exercise. Monitoring:   Weigh yourself daily.   If this causes undue stress, then just weigh yourself once a week. Weigh yourself the same time of the day with the same amount of clothing on. Preferably this should be done after waking up, before you eat, and with no clothing or minimal clothing on. Specific Recommendations:  Food log (ie.) www.easy2map.com,SundaySky,[employer].com,Setup. com,etc.   No sugary beverages. At least 64oz of water daily. Gradually increase physical activity to a goal of 5 days per week for 30 minutes of MODERATE intensity PLUS 2 days per week of FULL BODY resistance training    Continue with metformin. Topirmate 1/2 tablet (25mg) by mouth each evening for 7 days, then increase to 1 tablet (50mg) by mouth each evening thereafter  Potential side effects of Topamax (topiramate) include: numbness or tingling, fatigue, depression/anxiety, changes in taste, abdominal upset/heartburn, trouble sleeping, and may reduce sweating - stay well hydrated to avoid overheating. May reduce the effectiveness of hormonal birth control - backup method such as condoms recommended to avoid pregnancy. Behavioral health assessment. Calorie goal:  1800 calories a day (Provided with meal plan to follow). Return visit:  4 months      ______________________________________________________________________      Subjective:   Chief Complaint   Patient presents with   • Follow-up     MWM 3mf/u; [address]     Patient here to discuss weight associated problems and nutrition goals  HPI: Anabell Lugo  is a 70 y.o. male with history of prediabetes and excess weight. Weight loss plan:  Conservative Program.   Most recent notes and records were reviewed. Patient was last seen 5/16/2023. His recent A1c at that time had been done a year prior on 6/7/2022 and was 5.8%. He was advised to increase metformin extended release to 1500 mg daily. The next day his hemoglobin A1c was 6.1% with a repeat one now 5.9%.   She was advised an 1800-calorie a day restricted diet food logging for 2 weeks then 1 to 2 days a week thereafter. Wt Readings from Last 10 Encounters:   08/30/23 120 kg (265 lb)   05/16/23 119 kg (262 lb 6.4 oz)   01/31/23 120 kg (264 lb)   09/27/22 120 kg (264 lb 11.2 oz)   06/22/22 120 kg (264 lb 6.4 oz)   03/22/22 119 kg (262 lb 1.6 oz)   01/06/22 117 kg (258 lb 9.6 oz)   12/28/21 115 kg (254 lb 3.2 oz)   11/18/21 116 kg (256 lb)   10/28/21 117 kg (256 lb 14.4 oz)     Initial weight:  265  Previous office visit weight 262  Current weight: 265  Change in weight: +3lbs    Hunger/Cravings:  Struggles with mindless snacking and eating late at night. Patient denies personal and family history of  pancreatitis, thyroid cancer, MEN-2 tumors. Denies any hx of glaucoma, seizures, kidney stones, gallstones. Denies Hx of CAD, PAD, palpitations, arrhythmia. Denies uncontrolled anxiety or depression, suicidal ideation or behavior, insomnia or sleep disturbance. Review Of Systems:  Review of Systems   Constitutional: Negative for activity change, appetite change, chills, fatigue and fever. HENT: Negative for trouble swallowing. Respiratory: Negative for cough and shortness of breath. Cardiovascular: Negative for chest pain, palpitations and leg swelling. Gastrointestinal: Negative for abdominal pain, constipation, diarrhea, nausea and vomiting. Endocrine: Negative for cold intolerance and heat intolerance. Genitourinary: Negative for difficulty urinating and dysuria. Musculoskeletal: Negative for arthralgias, back pain, gait problem and myalgias. Skin: Negative for pallor and rash. Neurological: Negative for headaches. Psychiatric/Behavioral: Negative for dysphoric mood and suicidal ideas. The patient is not nervous/anxious. Objective:  /60   Pulse 70   Resp 17   Ht 6' (1.829 m)   Wt 120 kg (265 lb)   BMI 35.94 kg/m²   Physical Exam  Vitals and nursing note reviewed.    Constitutional:       General: He is not in acute distress. Appearance: Normal appearance. He is not ill-appearing or diaphoretic. Eyes:      General: No scleral icterus. Cardiovascular:      Rate and Rhythm: Normal rate and regular rhythm. Pulses: Normal pulses. Heart sounds: Normal heart sounds. No murmur heard. Pulmonary:      Effort: Pulmonary effort is normal. No respiratory distress. Breath sounds: Normal breath sounds. No stridor. No wheezing or rhonchi. Abdominal:      General: Bowel sounds are normal. There is no distension. Palpations: Abdomen is soft. There is no mass. Tenderness: There is no abdominal tenderness. Musculoskeletal:      Cervical back: Neck supple. Right lower leg: No edema. Left lower leg: No edema. Lymphadenopathy:      Cervical: No cervical adenopathy. Skin:     Capillary Refill: Capillary refill takes less than 2 seconds. Findings: No lesion or rash. Neurological:      Mental Status: He is alert and oriented to person, place, and time. Gait: Gait normal.   Psychiatric:         Mood and Affect: Mood normal.         Behavior: Behavior normal.       Labs and Imaging  Recent labs and imaging have been personally reviewed.     Lab Results   Component Value Date    HGBA1C 5.9 (H) 08/21/2023

## 2023-08-30 NOTE — ASSESSMENT & PLAN NOTE
Reluctant to increase dose due to lose stool on occasion. Tolerable currently. May consider after being on the current dose longer.

## 2023-08-30 NOTE — ASSESSMENT & PLAN NOTE
No coverage for GLP1 RA for weight loss with insurance. Discussed various generic AOM options. Agreeable to proceed with topiramate which may help cravings and after dinner eating/snacking. General Recommendations:  Nutrition:  Eat breakfast daily. Do not skip meals. Food log (ie.) www.myfitnesspal.com, sparkpeople. com, loseit.com, calorieking. com, etc.    Practice mindful eating. Be sure to set aside time to eat, eat slowly, and savor your food. Hydration: At least 64oz of water daily. No sugar sweetened beverages. No juice (eat the fruit instead). Exercise:  Studies have shown that the ideal exercise goal is somewhere between 150 to 300 minutes of moderate intensity exercise a week. Start with exercising 10 minutes every other day and gradually increase physical activity with a goal of at least 150 minutes of moderate intensity exercise a week, divided over at least 3 days a week. An example of this would be exercising 30 minutes a day, 5 days a week. Resistance training can increase muscle mass and increase our resting metabolic rate. FULL BODY resistance training is recommended 2-3 times a week. Do not do this on consecutive days to allow for muscle recovery. Aim for a bare minimum 5000 steps, even on days you do not exercise. Monitoring:   Weigh yourself daily. If this causes undue stress, then just weigh yourself once a week. Weigh yourself the same time of the day with the same amount of clothing on. Preferably this should be done after waking up, before you eat, and with no clothing or minimal clothing on. Specific Recommendations:  Food log (ie.) www.Anne Fogarty.com,[employer].com,calorieking. com,etc.   No sugary beverages. At least 64oz of water daily. Gradually increase physical activity to a goal of 5 days per week for 30 minutes of MODERATE intensity PLUS 2 days per week of FULL BODY resistance training    Continue with metformin.   Topirmate 1/2 tablet (25mg) by mouth each evening for 7 days, then increase to 1 tablet (50mg) by mouth each evening thereafter  Potential side effects of Topamax (topiramate) include: numbness or tingling, fatigue, depression/anxiety, changes in taste, abdominal upset/heartburn, trouble sleeping, and may reduce sweating - stay well hydrated to avoid overheating. May reduce the effectiveness of hormonal birth control - backup method such as condoms recommended to avoid pregnancy. Behavioral health assessment. Calorie goal:  1800 calories a day (Provided with meal plan to follow).     Return visit:  4 months

## 2023-08-30 NOTE — PATIENT INSTRUCTIONS
General Recommendations:  Nutrition:  Eat breakfast daily. Do not skip meals. Food log (ie.) www.myfitnesspal.com, sparkpeople. com, loseit.com, calorieking. com, etc.    Practice mindful eating. Be sure to set aside time to eat, eat slowly, and savor your food. Hydration: At least 64oz of water daily. No sugar sweetened beverages. No juice (eat the fruit instead). Exercise:  Studies have shown that the ideal exercise goal is somewhere between 150 to 300 minutes of moderate intensity exercise a week. Start with exercising 10 minutes every other day and gradually increase physical activity with a goal of at least 150 minutes of moderate intensity exercise a week, divided over at least 3 days a week. An example of this would be exercising 30 minutes a day, 5 days a week. Resistance training can increase muscle mass and increase our resting metabolic rate. FULL BODY resistance training is recommended 2-3 times a week. Do not do this on consecutive days to allow for muscle recovery. Aim for a bare minimum 5000 steps, even on days you do not exercise. Monitoring:   Weigh yourself daily. If this causes undue stress, then just weigh yourself once a week. Weigh yourself the same time of the day with the same amount of clothing on. Preferably this should be done after waking up, before you eat, and with no clothing or minimal clothing on. Specific Recommendations:  Food log (ie.) www.myfitnesspal.com,[employer],calorieking. com,etc.   No sugary beverages. At least 64oz of water daily. Gradually increase physical activity to a goal of 5 days per week for 30 minutes of MODERATE intensity PLUS 2 days per week of FULL BODY resistance training    Continue with metformin.   Topirmate 1/2 tablet (25mg) by mouth each evening for 7 days, then increase to 1 tablet (50mg) by mouth each evening thereafter  Potential side effects of Topamax (topiramate) include: numbness or tingling, fatigue, depression/anxiety, changes in taste, abdominal upset/heartburn, trouble sleeping, and may reduce sweating - stay well hydrated to avoid overheating. May reduce the effectiveness of hormonal birth control - backup method such as condoms recommended to avoid pregnancy. Behavioral health assessment. Calorie goal:  1800 calories a day (Provided with meal plan to follow).     Return visit:  4 months

## 2023-08-31 DIAGNOSIS — R73.03 PREDIABETES: Primary | ICD-10-CM

## 2023-08-31 RX ORDER — METFORMIN HYDROCHLORIDE 750 MG/1
1500 TABLET, EXTENDED RELEASE ORAL
Qty: 180 TABLET | Refills: 0
Start: 2023-08-31

## 2023-09-28 ENCOUNTER — OFFICE VISIT (OUTPATIENT)
Dept: BARIATRICS | Facility: CLINIC | Age: 68
End: 2023-09-28

## 2023-09-28 VITALS — WEIGHT: 266.6 LBS | BODY MASS INDEX: 36.16 KG/M2

## 2023-09-28 DIAGNOSIS — R63.5 ABNORMAL WEIGHT GAIN: Primary | ICD-10-CM

## 2023-09-28 PROCEDURE — RECHECK

## 2023-09-28 NOTE — PROGRESS NOTES
Initial consult 7/2021 with Dr. Sonia Baxter  Last visit 8/30/2023 with Dr. Chloe Aschoff  Starting weight 265.4#   Will start Topamax   Discussed his progress since 2021 and visited his motivation. Provided tools for accountability and mindfulness and awareness.

## 2024-01-08 ENCOUNTER — OFFICE VISIT (OUTPATIENT)
Dept: BARIATRICS | Facility: CLINIC | Age: 69
End: 2024-01-08
Payer: COMMERCIAL

## 2024-01-08 VITALS
RESPIRATION RATE: 16 BRPM | WEIGHT: 263 LBS | HEART RATE: 72 BPM | DIASTOLIC BLOOD PRESSURE: 72 MMHG | SYSTOLIC BLOOD PRESSURE: 120 MMHG | BODY MASS INDEX: 36.82 KG/M2 | HEIGHT: 71 IN

## 2024-01-08 DIAGNOSIS — R73.03 PREDIABETES: ICD-10-CM

## 2024-01-08 DIAGNOSIS — E78.5 HYPERLIPIDEMIA: ICD-10-CM

## 2024-01-08 DIAGNOSIS — G47.30 SLEEP APNEA WITH USE OF CONTINUOUS POSITIVE AIRWAY PRESSURE (CPAP): ICD-10-CM

## 2024-01-08 DIAGNOSIS — E66.9 OBESITY, CLASS II, BMI 35-39.9: Primary | ICD-10-CM

## 2024-01-08 PROCEDURE — 99214 OFFICE O/P EST MOD 30 MIN: CPT | Performed by: INTERNAL MEDICINE

## 2024-01-08 RX ORDER — CLOBETASOL PROPIONATE 0.5 MG/G
AEROSOL, FOAM TOPICAL AS NEEDED
COMMUNITY
Start: 2024-01-03

## 2024-01-08 RX ORDER — DOXYCYCLINE HYCLATE 50 MG/1
CAPSULE ORAL
COMMUNITY
Start: 2023-12-19

## 2024-01-08 RX ORDER — DEXAMETHASONE 0.5 MG/5ML
SOLUTION ORAL AS NEEDED
COMMUNITY
Start: 2023-11-29

## 2024-01-08 NOTE — PROGRESS NOTES
Assessment/Plan:  Leopoldo was seen today for follow-up.    Diagnoses and all orders for this visit:    Obesity, Class II, BMI 35-39.9    Sleep apnea with use of continuous positive airway pressure (CPAP)    Hyperlipidemia    Prediabetes       Initial: 265  lbs  Current:263  lbs  Change: -2 lbs  Goal: 220 lbs    - Weight not at goal  - Patient is interested in   - Labs reviewed: As below.    General Recommendations:  Nutrition:  Eat breakfast daily.  Do not skip meals.     Food log (ie.) www.myfitnesspal.com, sparkpeople.com, loseit.com, calorieking.com, etc.    Practice mindful eating.  Be sure to set aside time to eat, eat slowly, and savor your food.    Hydration:    At least 64oz of water daily.  No sugar sweetened beverages.  No juice (eat the fruit instead).    Exercise:  Studies have shown that the ideal exercise goal is somewhere between 150 to 300 minutes of moderate intensity exercise a week.  Start with exercising 10 minutes every other day and gradually increase physical activity with a goal of at least 150 minutes of moderate intensity exercise a week, divided over at least 3 days a week.  An example of this would be exercising 30 minutes a day, 5 days a week.  Resistance training can increase muscle mass and increase our resting metabolic rate.   FULL BODY resistance training is recommended 2-3 times a week.  Do not do this on consecutive days to allow for muscle recovery.    Aim for a bare minimum 5000 steps, even on days you do not exercise.    Monitoring:   Weigh yourself daily.  If this causes undue stress, then just weigh yourself once a week.  Weigh yourself the same time of the day with the same amount of clothing on.  Preferably this should be done after waking up, before you eat, and with no clothing or minimal clothing on.    Specific Goals:  Start topiramate.  Take as directed and contact the doctor with an update in 4-6 weeks.  Potential side effects of Topamax (topiramate) include: numbness  or tingling, fatigue, depression/anxiety, changes in taste, abdominal upset/heartburn, trouble sleeping, and may reduce sweating - stay well hydrated to avoid overheating.     Calorie goal:  1218-5671 calorie restricted diet    Food log and calorie track daily until you meet with one of our dieticians.    Continue with regular exercise.    Return visit:  3 months       Total time spent reviewing chart, interviewing patient, examining patient, discussing plan, answering all questions, and documentin min.       ______________________________________________________________________        Subjective:   Chief Complaint   Patient presents with    Follow-up     MWM- 4mth f/u- Waist 48in     Patient here to discuss weight associated problems and nutrition goals  HPI: Leopoldo Fishman  is a 68 y.o. male with history of prediabetes, hyperlipidemia, sleep apnea, and excess weight.  Weight loss plan:  Conservative Program.   Most recent notes and records were reviewed.  Patient was previously on Wegovy which was titrated up to 1.7 mg weekly before he lost insurance coverage for it.  Ended up being switched to Ozempic 1 mg weekly.  This was discontinued as he did not have any weight loss and experience constipation.  Started on metformin on 2023 by Jena Patel.  He did not tolerate an increase to metformin extended release 1500 mg daily up from 750 mg daily.  This was decreased back to 750 mg daily and he was advised to start topiramate when he was seen on 2023.  Increased back up to 1500mg a day.  Tolerating it fine now.  Did not start now.      Wt Readings from Last 10 Encounters:   24 119 kg (263 lb)   23 121 kg (266 lb 9.6 oz)   23 120 kg (265 lb)   23 119 kg (262 lb 6.4 oz)   23 120 kg (264 lb)   22 120 kg (264 lb 11.2 oz)   22 120 kg (264 lb 6.4 oz)   22 119 kg (262 lb 1.6 oz)   22 117 kg (258 lb 9.6 oz)   21 115 kg (254 lb 3.2 oz)     Initial  "weight: 265 lbs  Last OV weight: 265 lbs  Current weight: 263 lbs  Change in weight: -2 lbs  Goal: 220 lbs      Hunger/Cravings:  Mindless eating and excessive eating late at night  Hydration:  Adequate  Exercise:  Yes, >150min a week  Sleep:  Adequate      Patient denies personal and family history of  pancreatitis, thyroid cancer, MEN-2 tumors.  Denies any hx of glaucoma, seizures, kidney stones, gallstones.  Denies Hx of CAD, PAD, palpitations, arrhythmia.   Denies uncontrolled anxiety or depression, suicidal ideation or behavior, insomnia or sleep disturbance.     The following portions of the patient's history were reviewed and updated as appropriate: allergies, current medications, past family history, past medical history, past social history, past surgical history, and problem list.    Review Of Systems:  Review of Systems   Constitutional:  Negative for activity change, appetite change, chills, fatigue and fever.   HENT:  Negative for trouble swallowing.    Respiratory:  Negative for cough and shortness of breath.    Cardiovascular:  Negative for chest pain, palpitations and leg swelling.   Gastrointestinal:  Negative for abdominal pain, constipation, diarrhea, nausea and vomiting.   Endocrine: Negative for cold intolerance and heat intolerance.   Genitourinary:  Negative for difficulty urinating and dysuria.   Musculoskeletal:  Negative for arthralgias, back pain, gait problem and myalgias.   Skin:  Negative for pallor and rash.   Neurological:  Negative for headaches.   Psychiatric/Behavioral:  Negative for dysphoric mood and suicidal ideas. The patient is not nervous/anxious.        Objective:  /72 (BP Location: Left arm, Patient Position: Sitting)   Pulse 72   Resp 16   Ht 5' 11\" (1.803 m)   Wt 119 kg (263 lb)   BMI 36.68 kg/m²   Physical Exam    Labs and Imaging  Recent labs and imaging have been personally reviewed.  Lab Results   Component Value Date    HGBA1C 5.9 (H) 12/27/2023     "

## 2024-01-29 ENCOUNTER — CLINICAL SUPPORT (OUTPATIENT)
Dept: BARIATRICS | Facility: CLINIC | Age: 69
End: 2024-01-29

## 2024-01-29 VITALS — WEIGHT: 263.1 LBS | BODY MASS INDEX: 36.83 KG/M2 | HEIGHT: 71 IN

## 2024-01-29 DIAGNOSIS — R63.5 ABNORMAL WEIGHT GAIN: Primary | ICD-10-CM

## 2024-01-29 PROCEDURE — RECHECK

## 2024-01-29 PROCEDURE — WMDI30

## 2024-01-29 NOTE — PROGRESS NOTES
"Weight Management Medical Nutrition Assessment  Leopoldo is here for meal planning. Has been following with medical providers. Current wt: 263.1 lbs. He has overall maintained his weight since July 2021. Currently on metformin and topamax. Has been using Lose it over the last week. Notes that snacking is largest concern, shows example of day where snacks totaled ~1200 calories. Based on recall he would benefit from increasing protein at his meals. Discussed that this may help with some of his excess snacking. Water intake also appears inadequate. Discussed how this can present as hunger when he is really feeling thirsty. He does stay active at the gym, walking etc. Meal plan and other resources provided. Options for f/u reviewed. He will f/u in 2 months.     Dislikes: tuna, fish    Patient seen by Medical Provider in past 6 months:  yes  Requested to schedule appointment with Medical Provider: No      Anthropometric Measurements  Start Weight (#): 265.4 lbs 7/9/21  Current Weight (#): 263.1 lbs   TBW % Change from start weight: -  Ideal Body Weight (#):(5'11\" 176 lbs) BMI 25 182.6 lbs   Goal Weight (#):220 lbs   Highest: 269 lbs   Lowest: 210 lbs     Weight Loss History  Previous weight loss attempts: Counseling with  MD  Exercise  Self Created Diets (Portion Control, Healthy Food Choices, etc.)  Wegovy  Ozempic  metformin    Food and Nutrition Related History  Wake up: 8:30-9:00   Bed Time:12:00    Food Recall  Breakfast:9:30: 2 eggs, 2 wheat bread, diet coke    Snack:skip  Lunch:12-12:30: s/w on 647 bread, deli turkey 3 slices, few low fat chips, 1 cup skim milk, water  Snack:2:00: soup or snack pack pudding or peanuts (would have multiple snacks)   4 p.m. 1 piece 647, pb, pretzles, diet coke caffeine  Dinner:6-6:30: ~6 oz protein, baked potato, peas, 647 bread   Snack:9:00: reeses miniature, few ritz crackers; then 10 p.m. ice cream s/w skinny cow; then 11:45: goldfish crackers     Beverages: water, skim milk, and " diet soda  Volume of beverage intake: 24 oz water, 36 oz diet caffeine free coke, 1 cup milk, 1 cup coffee    Weekends: Same  Cravings: carbs   Trouble area of day:nighttime 9 p.m. on    Frequency of Eating out: varies  Food restrictions:n/a  Cooking: wife  Food Shopping: self    Physical Activity Intake  Activity:Walking and Strength Training, raquetball   Frequency: several times/wk  Physical limitations/barriers to exercise: n/a    Estimated Needs  Energy  SECA: BMR:n/a      X 1.3 -1000 =  Brian Bradshaw Energy Needs: BMR : 1981   1-2# loss weekly sedentary:  4020-9517             1-2# loss weekly lightly active:7337-2858  Maintenance calories for sedentary activity level: 2377  Protein: gm      (1.2-1.5g/kg IBW)  Fluid: 93 oz     (35mL/kg IBW)    Nutrition Diagnosis  Yes;    Overweight/obesity  related to Excess energy intake as evidenced by  BMI more than normative standard for age and sex (obesity-grade II 35-39.9)       Nutrition Intervention    Nutrition Prescription  Calories:5366-0646  Protein:105-125 gm    Meal Plan (Rah/Pro)  Breakfast: 400, 25-30  Snack: skip  Lunch: 400, 25  Snack: 200, 5-10  Dinner: 500-550, 45  Snack: 200-250, 5-15    Nutrition Education:    Calorie controlled menu  Lean protein food choices  Healthy snack options  Food journaling tips  Protein bars    Nutrition Counseling:  Strategies: meal planning, portion sizes, healthy snack choices, hydration, fiber intake, protein intake, exercise, food journal    Monitoring and Evaluation:  Evaluation criteria:  Energy Intake  Meet protein needs  Maintain adequate hydration  Monitor weekly weight  Meal planning/preparation  Food journal   Decreased portions at mealtimes and snacks  Physical activity     Barriers to learning:none  Readiness to change: Action:  (Changing behavior)  Comprehension: good  Expected Compliance: good

## 2024-02-13 DIAGNOSIS — E66.9 OBESITY, CLASS II, BMI 35-39.9: Primary | ICD-10-CM

## 2024-02-13 RX ORDER — TOPIRAMATE 50 MG/1
50 TABLET, FILM COATED ORAL EVERY 12 HOURS SCHEDULED
Qty: 60 TABLET | Refills: 1 | Status: SHIPPED | OUTPATIENT
Start: 2024-02-13

## 2024-02-18 DIAGNOSIS — R73.03 PREDIABETES: ICD-10-CM

## 2024-02-19 RX ORDER — METFORMIN HYDROCHLORIDE 750 MG/1
TABLET, EXTENDED RELEASE ORAL
Qty: 180 TABLET | Refills: 0 | Status: SHIPPED | OUTPATIENT
Start: 2024-02-19

## 2024-03-21 ENCOUNTER — HOSPITAL ENCOUNTER (OUTPATIENT)
Dept: RADIOLOGY | Facility: HOSPITAL | Age: 69
Discharge: HOME | End: 2024-03-21
Payer: COMMERCIAL

## 2024-03-21 DIAGNOSIS — M25.561 RIGHT KNEE PAIN, UNSPECIFIED CHRONICITY: ICD-10-CM

## 2024-03-21 PROCEDURE — 73721 MRI JNT OF LWR EXTRE W/O DYE: CPT

## 2024-03-21 PROCEDURE — G1004 CDSM NDSC: HCPCS

## 2024-04-13 DIAGNOSIS — E66.9 OBESITY, CLASS II, BMI 35-39.9: ICD-10-CM

## 2024-04-15 RX ORDER — TOPIRAMATE 50 MG/1
50 TABLET, FILM COATED ORAL EVERY 12 HOURS
Qty: 60 TABLET | Refills: 1 | Status: SHIPPED | OUTPATIENT
Start: 2024-04-15

## 2024-04-15 NOTE — PROGRESS NOTES
"Weight Management Medical Nutrition Assessment  Leopoldo is here for 1 of 3 bundle.  Current wt:  270.1 lbs. He has gained 7 lbs x just under 3 months with overall  gained 4.7 lbs since July 2021. Had been taking topiramate but did not find it to be beneficial. Had more travel which he feels made things harder. Snacking continues to be the biggest issue. Needs to replace some of his snacks with better quality. Suggestions provided for incorporating protein with snacks. Questions answered re: artificial sweeteners. He will f/u in 2 1/2 months.     Dislikes: tuna, fish, greek yogurt     Patient seen by Medical Provider in past 6 months:  yes  Requested to schedule appointment with Medical Provider: No      Anthropometric Measurements  Start Weight (#): 265.4 lbs 7/9/21  Current Weight (#): 270.1 lbs   TBW % Change from start weight: gain 1.8%  Ideal Body Weight (#):(5'11\" 176 lbs) BMI 25 182.6 lbs   Goal Weight (#):220 lbs   Highest: 269 lbs   Lowest: 210 lbs     Weight Loss History  Previous weight loss attempts: Counseling with  MD  Exercise  Self Created Diets (Portion Control, Healthy Food Choices, etc.)  Wegovy  Ozempic  metformin    Food and Nutrition Related History  Wake up: 8:30-9:00   Bed Time:12:00    Food Recall  Breakfast:9:30: 2 eggs, 2 wheat bread, turkey sausage, 1/2 piece cheese, diet coke    Snack:skip OR fruit  Lunch:12-12:30: s/w on 647 bread, deli turkey 3 slices, snack bag chips, 1 cup skim milk, water  Snack:2:00: soup OR pretzels or peanuts or pudding etc  4 p.m. 1 piece 647, pb, unmeasured pretzels, diet coke caffeine  Dinner:6-6:30: ~6 oz protein, potato, veggies  Snack:9:00: reeses miniature, unmeasured ritz crackers; then 10 p.m. ice cream s/w skinny cow might have 2; then 11:45: goldfish crackers     Beverages: water, skim milk, and diet soda  Volume of beverage intake: 24 oz water, 36 oz diet caffeine free coke, 1 cup milk, 1 cup coffee    Weekends: Same  Cravings: carbs   Trouble area of " day:midday & nighttime 9 p.m. on    Frequency of Eating out: varies  Food restrictions:n/a  Cooking: wife  Food Shopping: self    Physical Activity Intake  Activity:Walking and Strength Trainingyvette   Frequency: several times/wk  Physical limitations/barriers to exercise: n/a    Estimated Needs  Energy  SECA: BMR:n/a      X 1.3 -1000 =  Brian [employer]shaw Energy Needs: BMR : 2012   1-2# loss weekly sedentary:  5029-4961            1-2# loss weekly lightly active:7985-3488  Maintenance calories for sedentary activity level: 2415  Protein: gm      (1.2-1.5g/kg IBW)  Fluid: 93 oz     (35mL/kg IBW)    Nutrition Diagnosis  Yes;    Overweight/obesity  related to Excess energy intake as evidenced by  BMI more than normative standard for age and sex (obesity-grade II 35-39.9)       Nutrition Intervention    Nutrition Prescription  Calories:6956-5614  Protein:105-125 gm    Meal Plan (Rah/Pro)  Breakfast: 400, 25-30  Snack: skip  Lunch: 400, 25  Snack: 200, 5-10  Dinner: 500-550, 45  Snack: 200-250, 5-15    Nutrition Education:    Calorie controlled menu  Lean protein food choices  Healthy snack options  Food journaling tips  Protein bars    Nutrition Counseling:  Strategies: meal planning, portion sizes, healthy snack choices, hydration, fiber intake, protein intake, exercise, food journal    Monitoring and Evaluation:  Evaluation criteria:  Energy Intake  Meet protein needs  Maintain adequate hydration  Monitor weekly weight  Meal planning/preparation  Food journal   Decreased portions at mealtimes and snacks  Physical activity     Barriers to learning:none  Readiness to change: Relapse: (Returning to older behaviors and abandoning the new changes)   Comprehension: good  Expected Compliance: good

## 2024-04-22 ENCOUNTER — CLINICAL SUPPORT (OUTPATIENT)
Dept: BARIATRICS | Facility: CLINIC | Age: 69
End: 2024-04-22

## 2024-04-22 VITALS — WEIGHT: 270.1 LBS | BODY MASS INDEX: 37.81 KG/M2 | HEIGHT: 71 IN

## 2024-04-22 DIAGNOSIS — R63.5 ABNORMAL WEIGHT GAIN: Primary | ICD-10-CM

## 2024-04-22 PROCEDURE — DB3PK

## 2024-04-22 PROCEDURE — RECHECK

## 2024-05-15 LAB
EST. AVERAGE GLUCOSE BLD GHB EST-MCNC: 131 MG/DL
EST. AVERAGE GLUCOSE BLD GHB EST-SCNC: 7.3 MMOL/L
HBA1C MFR BLD: 6.2 % OF TOTAL HGB

## 2024-05-17 DIAGNOSIS — R73.03 PREDIABETES: ICD-10-CM

## 2024-05-20 RX ORDER — METFORMIN HYDROCHLORIDE 750 MG/1
TABLET, EXTENDED RELEASE ORAL
Qty: 180 TABLET | Refills: 0 | Status: SHIPPED | OUTPATIENT
Start: 2024-05-20 | End: 2024-05-23 | Stop reason: SDUPTHER

## 2024-05-23 ENCOUNTER — OFFICE VISIT (OUTPATIENT)
Dept: BARIATRICS | Facility: CLINIC | Age: 69
End: 2024-05-23
Payer: COMMERCIAL

## 2024-05-23 VITALS
SYSTOLIC BLOOD PRESSURE: 108 MMHG | DIASTOLIC BLOOD PRESSURE: 70 MMHG | HEART RATE: 70 BPM | BODY MASS INDEX: 36.2 KG/M2 | HEIGHT: 71 IN | WEIGHT: 258.6 LBS

## 2024-05-23 DIAGNOSIS — E66.9 OBESITY, CLASS II, BMI 35-39.9: Primary | ICD-10-CM

## 2024-05-23 DIAGNOSIS — R73.03 PREDIABETES: ICD-10-CM

## 2024-05-23 PROCEDURE — G2211 COMPLEX E/M VISIT ADD ON: HCPCS | Performed by: INTERNAL MEDICINE

## 2024-05-23 PROCEDURE — 99213 OFFICE O/P EST LOW 20 MIN: CPT | Performed by: INTERNAL MEDICINE

## 2024-05-23 RX ORDER — METFORMIN HYDROCHLORIDE 750 MG/1
1500 TABLET, EXTENDED RELEASE ORAL
Qty: 180 TABLET | Refills: 0 | Status: SHIPPED | OUTPATIENT
Start: 2024-08-15

## 2024-05-23 RX ORDER — TAPINAROF 10 MG/1000MG
1 CREAM TOPICAL AS NEEDED
COMMUNITY
Start: 2024-05-15

## 2024-05-23 NOTE — PROGRESS NOTES
Assessment/Plan:  Leopoldo was seen today for follow-up.    Diagnoses and all orders for this visit:    Obesity, Class II, BMI 35-39.9    Prediabetes  -     Hemoglobin A1C; Future  -     metFORMIN (GLUCOPHAGE-XR) 750 mg 24 hr tablet; Take 2 tablets (1,500 mg total) by mouth daily with breakfast Do not start before August 15, 2024.      Congratulations on dedicating yourself to the lifestyle chagnes that have resulted in 12lbs of weight loss in the past month.    Continue these efforts and meet with Radha as scheduled for 2024.    Follow-up with Ijeoma Martinez in 3 months.    Repeat HgA1C prior to this appointment.    If you are struggling to continue the progress you may consider contrave or mounjaro in the future.    Total time spent reviewing chart, interviewing patient, examining patient, discussing plan, answering all questions, and documentin min.       ______________________________________________________________________    Subjective:   Chief Complaint   Patient presents with    Follow-up     Pt is here for MWM f/u. Waist - 47     Patient here to discuss weight associated problems and nutrition goals  HPI: Leopoldo Fishman  is a 68 y.o. male with history of diabetes, LILY, and excess weight.  Weight loss plan:  Conservative Program.   Most recent notes and records were reviewed.  Patient was previously on Wegovy which was titrated up to 1.7 mg weekly then switched to Ozempic due to change in insurance formulary.  He reported constipation and GERD as side effects.  Also not effective for weight loss.  He was started on metformin on 2023 which was titrated up to 1500 mg daily.  He was tried on topiramate which was titrated up to 50 mg twice a day without any improvement in appetite, craving or weight reduction.  This medication was since stopped.  Hg A1C now rising from 5.9 % on 23 to now 6.2% on 5/15/2024.  Increase protein and reduced carbs.      Wt Readings from Last 10 Encounters:  "  05/23/24 117 kg (258 lb 9.6 oz)   04/22/24 123 kg (270 lb 1.6 oz)   01/29/24 119 kg (263 lb 1.6 oz)   01/08/24 119 kg (263 lb)   03/21/24 118 kg (260 lb)   09/28/23 121 kg (266 lb 9.6 oz)   08/30/23 120 kg (265 lb)   05/16/23 119 kg (262 lb 6.4 oz)   01/31/23 120 kg (264 lb)   09/27/22 120 kg (264 lb 11.2 oz)     Initial: 265  lbs  Previous: 263  lbs  Current weight: 258 lbs  Change: -5 lbs  Goal: 220 lbs    Gained up to 270.  Met with dietician.  Lost 12 lbs in a month.    Review Of Systems:  Review of Systems   Constitutional:  Negative for activity change, appetite change, chills, fatigue and fever.   HENT:  Negative for trouble swallowing.    Respiratory:  Negative for cough and shortness of breath.    Cardiovascular:  Negative for chest pain, palpitations and leg swelling.   Gastrointestinal:  Negative for abdominal pain, constipation, diarrhea, nausea and vomiting.   Endocrine: Negative for cold intolerance and heat intolerance.   Genitourinary:  Negative for difficulty urinating and dysuria.   Musculoskeletal:  Negative for arthralgias, back pain, gait problem and myalgias.   Skin:  Negative for pallor and rash.   Neurological:  Negative for headaches.   Psychiatric/Behavioral:  Negative for dysphoric mood and suicidal ideas. The patient is not nervous/anxious.      Objective:  /70 (BP Location: Left arm, Patient Position: Sitting, Cuff Size: Large)   Pulse 70   Ht 5' 11\" (1.803 m)   Wt 117 kg (258 lb 9.6 oz)   BMI 36.07 kg/m²   Physical Exam  Vitals and nursing note reviewed.   Constitutional:       General: He is not in acute distress.     Appearance: Normal appearance. He is not ill-appearing or diaphoretic.   Eyes:      General: No scleral icterus.  Cardiovascular:      Rate and Rhythm: Normal rate and regular rhythm.      Pulses: Normal pulses.      Heart sounds: Normal heart sounds. No murmur heard.  Pulmonary:      Effort: Pulmonary effort is normal. No respiratory distress.      Breath " "sounds: Normal breath sounds. No stridor. No wheezing or rhonchi.   Musculoskeletal:      Cervical back: Neck supple.      Right lower leg: No edema.      Left lower leg: No edema.   Lymphadenopathy:      Cervical: No cervical adenopathy.   Skin:     Capillary Refill: Capillary refill takes less than 2 seconds.      Findings: No lesion or rash.   Neurological:      Mental Status: He is alert and oriented to person, place, and time.      Gait: Gait normal.   Psychiatric:         Mood and Affect: Mood normal.         Behavior: Behavior normal.       Labs and Imaging  Recent labs and imaging have been personally reviewed.  No results found for: \"WBC\", \"HGB\", \"HCT\", \"MCV\", \"PLT\"  No results found for: \"NA\", \"SODIUM\", \"K\", \"CL\", \"CO2\", \"ANIONGAP\", \"AGAP\", \"BUN\", \"CREATININE\", \"GLUC\", \"GLUF\", \"CALCIUM\", \"AST\", \"ALT\", \"ALKPHOS\", \"PROT\", \"TP\", \"BILITOT\", \"TBILI\", \"EGFR\"  Lab Results   Component Value Date    HGBA1C 6.2 (H) 05/15/2024       "

## 2024-05-23 NOTE — PATIENT INSTRUCTIONS
Congratulations on dedicating yourself to the lifestyle chagnes that have resulted in 12lbs of weight loss in the past month.    Continue these efforts and meet with Radha as scheduled for 7/17/2024.    Follow-up with Ijeoma Martinez in 3 months.    Repeat HgA1C prior to this appointment.    If you are struggling to continue the progress you may consider contrave or mounjaro in the future.
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
## 2024-06-03 DIAGNOSIS — R73.01 IFG (IMPAIRED FASTING GLUCOSE): ICD-10-CM

## 2024-06-03 DIAGNOSIS — R73.03 PREDIABETES: Primary | ICD-10-CM

## 2024-07-08 NOTE — PROGRESS NOTES
"Weight Management Medical Nutrition Assessment  Leopoldo is here for 2 of 3 bundle.  Current wt: 249.6  lbs. He has lost 9 lbs x just under 2 months and 20.5 lbs x 3 months with overall loss of 15.8 lbs since July 2021. He remains on metformin. Felt after our last visit he needed to get into a better headspace. He made many changes that we discussed last visit including better quality snacks and increasing protein. Finds he is not feeling as hungry as prior. Notices positive changes with clothing[place]. He will be going to [place] in October but last time he was there reports he had actually lost weight. Encouraged to work on free water intake as he feels this is still lacking. Keep up the great work! He will f/u in ~3 1/2 months.      Dislikes: tuna, fish, greek yogurt     Patient seen by Medical Provider in past 6 months:  yes  Requested to schedule appointment with Medical Provider: No      Anthropometric Measurements  Start Weight (#): 265.4 lbs 7/9/21  Current Weight (#): 249.6 lbs   TBW % Change from start weight: 5.9%  Ideal Body Weight (#):(5'11\" 176 lbs) BMI 25 182.6 lbs   Goal Weight (#):220 lbs   Highest: 269 lbs   Lowest: 210 lbs     Weight Loss History  Previous weight loss attempts: Counseling with  MD  Exercise  Self Created Diets (Portion Control, Healthy Food Choices, etc.)  Wegovy  Ozempic  metformin    Food and Nutrition Related History  Wake up: 8:30-9:00   Bed Time:12:00    Food Recall  Breakfast:9:30: 2 eggs, 2 wheat bread, turkey sausage, 1/2 piece cheese, diet coke    Snack:skip OR fruit  Lunch:12-12:30: s/w on 647 bread, deli turkey 3 slices, snack bag chips, 1 cup skim milk, water  Snack:2:00: soup OR protein bar  4 p.m. 1 piece 647, pb, unmeasured pretzels, diet coke caffeine  Dinner:6-6:30: ~6 oz protein, potato, veggies  Snack:9:00: premier protein  12:00: yasso bar, snack bag pretzels or chips     Beverages: water, skim milk, and diet " soda  Volume of beverage intake: 24 oz water, 36 oz diet caffeine free coke, 1 cup milk, 1 cup coffee    Weekends: Same  Cravings: carbs   Trouble area of day:midday & nighttime 9 p.m. on    Frequency of Eating out: varies  Food restrictions:n/a  Cooking: wife  Food Shopping: self    Physical Activity Intake  Activity:Walking and Strength Training, raquetball   Frequency: several times/wk  Physical limitations/barriers to exercise: n/a    Estimated Needs  Energy  SECA: BMR:n/a      X 1.3 -1000 =  [address] [place] Energy Needs: BMR : 1914   1-2# loss weekly sedentary:  1027-7299            1-2# loss weekly lightly active:6108-6201  Maintenance calories for sedentary activity level: 2297  Protein: gm      (1.2-1.5g/kg IBW)  Fluid: 93 oz     (35mL/kg IBW)    Nutrition Diagnosis  Yes;    Overweight/obesity  related to Excess energy intake as evidenced by  BMI more than normative standard for age and sex (obesity-grade I 30-34.9)       Nutrition Intervention    Nutrition Prescription  Calories:4234-3564  Protein:105-125 gm    Meal Plan (Rah/Pro)  Breakfast: 400, 25-30  Snack: skip  Lunch: 400, 25  Snack: 200, 5-10  Dinner: 500-550, 45  Snack: 200-250, 5-15    Nutrition Education:    Calorie controlled menu  Lean protein food choices  Healthy snack options  Food journaling tips  Protein bars    Nutrition Counseling:  Strategies: meal planning, portion sizes, healthy snack choices, hydration, fiber intake, protein intake, exercise, food journal    Monitoring and Evaluation:  Evaluation criteria:  Energy Intake  Meet protein needs  Maintain adequate hydration  Monitor weekly weight  Meal planning/preparation  Food journal   Decreased portions at mealtimes and snacks  Physical activity     Barriers to learning:none  Readiness to change: Action:  (Changing behavior)  Comprehension: good  Expected Compliance: good

## 2024-07-17 ENCOUNTER — CLINICAL SUPPORT (OUTPATIENT)
Dept: BARIATRICS | Facility: CLINIC | Age: 69
End: 2024-07-17

## 2024-07-17 VITALS — WEIGHT: 249.6 LBS | BODY MASS INDEX: 34.94 KG/M2 | HEIGHT: 71 IN

## 2024-07-17 DIAGNOSIS — R63.5 ABNORMAL WEIGHT GAIN: Primary | ICD-10-CM

## 2024-07-17 PROCEDURE — RECHECK

## 2024-08-20 LAB — HBA1C MFR BLD HPLC: 6.2 %

## 2024-08-27 ENCOUNTER — OFFICE VISIT (OUTPATIENT)
Age: 69
End: 2024-08-27

## 2024-08-27 VITALS
HEIGHT: 71 IN | DIASTOLIC BLOOD PRESSURE: 70 MMHG | SYSTOLIC BLOOD PRESSURE: 130 MMHG | WEIGHT: 246.2 LBS | BODY MASS INDEX: 34.47 KG/M2 | HEART RATE: 70 BPM | TEMPERATURE: 97.8 F

## 2024-08-27 DIAGNOSIS — E66.9 OBESITY, CLASS I, BMI 30-34.9: Primary | ICD-10-CM

## 2024-08-27 DIAGNOSIS — R73.03 PREDIABETES: ICD-10-CM

## 2024-08-27 DIAGNOSIS — E78.5 HYPERLIPIDEMIA: ICD-10-CM

## 2024-08-27 DIAGNOSIS — G47.30 SLEEP APNEA WITH USE OF CONTINUOUS POSITIVE AIRWAY PRESSURE (CPAP): ICD-10-CM

## 2024-08-27 DIAGNOSIS — R73.01 IFG (IMPAIRED FASTING GLUCOSE): ICD-10-CM

## 2024-08-27 NOTE — PROGRESS NOTES
Assessment/Plan:  1. Obesity, Class I, BMI 30-34.9        2. Prediabetes        3. IFG (impaired fasting glucose)        4. Sleep apnea with use of continuous positive airway pressure (CPAP)        5. Hyperlipidemia            Initial: 265  lbs (8/2023)  Previous: 258 lbs (5/2024)  Current weight: 246 lbs (8/27/24)  Change: -26 lbs  Goal: 220 lbs    - Weight not at goal  - Patient is interested in Conservative Program  - Labs reviewed: As below.    General Recommendations:  Nutrition:  Eat breakfast daily.  Do not skip meals.     Food log (ie.) www.[employer].com, sparkpeople.com, loseit.com, calorieking.com, etc.    Practice mindful eating.  Be sure to set aside time to eat, eat slowly, and savor your food.    Hydration:    At least 64oz of water daily.  No sugar sweetened beverages.  No juice (eat the fruit instead).    Exercise:  Studies have shown that the ideal exercise goal is somewhere between 150 to 300 minutes of moderate intensity exercise a week.  Start with exercising 10 minutes every other day and gradually increase physical activity with a goal of at least 150 minutes of moderate intensity exercise a week, divided over at least 3 days a week.  An example of this would be exercising 30 minutes a day, 5 days a week.  Resistance training can increase muscle mass and increase our resting metabolic rate.   FULL BODY resistance training is recommended 2-3 times a week.  Do not do this on consecutive days to allow for muscle recovery.    Aim for a bare minimum 5000 steps, even on days you do not exercise.    Monitoring:   Weigh yourself daily.  If this causes undue stress, then just weigh yourself once a week.  Weigh yourself the same time of the day with the same amount of clothing on.  Preferably this should be done after waking up, before you eat, and with no clothing or minimal clothing on.    Nutrition Prescription  Calories:9838-7608  Protein:105-125 gm    Return visit:    Calorie  tracking/deficit  Physical activity goals - keep up the good work!      AOM   Previously took topamax without improvement  Insurance not covering GLP1 RA for weight loss at this time. No past history of MI or stroke  Currently managed on metformin 1500mg QD  Discussed potentially adding contrave. As patient is having good results currently will hold on additional pharmacotherapy  RTC in 6 months  Update hemoglobin A1c prior. If A1c progressing to 6.5+ despite metformin and weight loss, consider ozempic/mounjaro  ______________________________________________________________________    Subjective:   Chief Complaint   Patient presents with    Follow-up     MWM 3MTH F/U     Patient here to discuss weight associated problems and nutrition goals  HPI: Leopoldo Fishman  is a 69 y.o. male with history of diabetes, LILY, and excess weight.  Weight loss plan:  Conservative Program.   Most recent notes and records were reviewed.    Patient was previously on Wegovy which was titrated up to 1.7 mg weekly then switched to Ozempic due to change in insurance formulary.  He reported constipation and GERD as side effects.  Also not effective for weight loss.  He was started on metformin on 1/31/2023 which was titrated up to 1500 mg daily.  He was tried on topiramate which was titrated up to 50 mg twice a day without any improvement in appetite, craving or weight reduction.  This medication was since stopped.  Hg A1C now rising from 5.9 % on 12/27/23 to now 6.2% on 5/15/2024.  Increase protein and reduced carbs.     RD Visit 7/2024  Feels like get got motivated  Dietary - increased his protein, decreased starch. Potrion things out with 100 ilya packs. Increased water.  Exercise - walking daily, averages 10,000 steps/day. Cutting grass push mower. Going to [employer] 2-3x/week.  Pickle ball in [place].         Wt Readings from Last 10 Encounters:   08/27/24 112 kg (246 lb 3.2 oz)   07/17/24 113 kg (249 lb 9.6 oz)   05/23/24 117 kg (258 lb  "9.6 oz)   04/22/24 123 kg (270 lb 1.6 oz)   01/29/24 119 kg (263 lb 1.6 oz)   01/08/24 119 kg (263 lb)   03/21/24 118 kg (260 lb)   09/28/23 121 kg (266 lb 9.6 oz)   08/30/23 120 kg (265 lb)   05/16/23 119 kg (262 lb 6.4 oz)     Initial: 265  lbs  Previous: 263  lbs  Current weight: 258 lbs  Change: -5 lbs  Goal: 220 lbs    Gained up to 270.  Met with dietician.  Lost 12 lbs in a month.    Review Of Systems:  Review of Systems   Constitutional:  Negative for activity change, appetite change, chills, fatigue and fever.   HENT:  Negative for trouble swallowing.    Respiratory:  Negative for cough and shortness of breath.    Cardiovascular:  Negative for chest pain, palpitations and leg swelling.   Gastrointestinal:  Negative for abdominal pain, constipation, diarrhea, nausea and vomiting.   Endocrine: Negative for cold intolerance and heat intolerance.   Genitourinary:  Negative for difficulty urinating and dysuria.   Musculoskeletal:  Negative for arthralgias, back pain, gait problem and myalgias.   Skin:  Negative for pallor and rash.   Neurological:  Negative for headaches.   Psychiatric/Behavioral:  Negative for dysphoric mood and suicidal ideas. The patient is not nervous/anxious.      Objective:  /70   Pulse 70   Temp 97.8 °F (36.6 °C) (Tympanic)   Ht 5' 11\" (1.803 m)   Wt 112 kg (246 lb 3.2 oz)   BMI 34.34 kg/m²   Physical Exam  Vitals and nursing note reviewed.   Constitutional:       General: He is not in acute distress.     Appearance: Normal appearance. He is not ill-appearing or diaphoretic.   Eyes:      General: No scleral icterus.  Cardiovascular:      Rate and Rhythm: Normal rate and regular rhythm.      Pulses: Normal pulses.      Heart sounds: Normal heart sounds. No murmur heard.  Pulmonary:      Effort: Pulmonary effort is normal. No respiratory distress.      Breath sounds: Normal breath sounds. No stridor. No wheezing or rhonchi.   Musculoskeletal:      Cervical back: Neck supple.      " "Right lower leg: No edema.      Left lower leg: No edema.   Lymphadenopathy:      Cervical: No cervical adenopathy.   Skin:     Capillary Refill: Capillary refill takes less than 2 seconds.      Findings: No lesion or rash.   Neurological:      Mental Status: He is alert and oriented to person, place, and time.      Gait: Gait normal.   Psychiatric:         Mood and Affect: Mood normal.         Behavior: Behavior normal.       Labs and Imaging  Recent labs and imaging have been personally reviewed.  No results found for: \"WBC\", \"HGB\", \"HCT\", \"MCV\", \"PLT\"  No results found for: \"NA\", \"SODIUM\", \"K\", \"CL\", \"CO2\", \"ANIONGAP\", \"AGAP\", \"BUN\", \"CREATININE\", \"GLUC\", \"GLUF\", \"CALCIUM\", \"AST\", \"ALT\", \"ALKPHOS\", \"PROT\", \"TP\", \"BILITOT\", \"TBILI\", \"EGFR\"  Lab Results   Component Value Date    HGBA1C 6.2 (H) 05/15/2024       "

## 2024-09-04 ENCOUNTER — HOSPITAL ENCOUNTER (OUTPATIENT)
Dept: RADIOLOGY | Age: 69
Discharge: HOME | End: 2024-09-04
Payer: COMMERCIAL

## 2024-09-04 DIAGNOSIS — Z87.891 PERSONAL HISTORY OF TOBACCO USE, PRESENTING HAZARDS TO HEALTH: ICD-10-CM

## 2024-09-04 PROCEDURE — 71271 CT THORAX LUNG CANCER SCR C-: CPT

## 2024-09-04 PROCEDURE — 76706 US ABDL AORTA SCREEN AAA: CPT

## 2024-10-28 NOTE — PROGRESS NOTES
"Weight Management Medical Nutrition Assessment  Leopoldo is here for 3 of 3 bundle.  Current wt:  242.2  lbs. He has lost 7.4  lbs x just under 4 months  with overall loss of 23.2   lbs since July 2021. Avg 400-500 calories/meal. Still with higher calories with snacks but reports this remains much better than prior. He struggles with hunger shortly after lunch. Discussed that this could be thirst as he fluid intake tends to be inadequate. Suggest when he starts feeling this hunger to try drinking a glass of water to see if this helps. If it does not he may need to add to his lunch for more satisfaction into the afternoon. Was recently in [place] and reports he lost 1 lb!  Keep up the great work! He will f/u in ~4 months.      Dislikes: tuna, fish, greek yogurt     Patient seen by Medical Provider in past 6 months:  yes  Requested to schedule appointment with Medical Provider: No    Anthropometric Measurements  Start Weight (#): 265.4 lbs 7/9/21  Current Weight (#): 242.2 lbs   TBW % Change from start weight: 8.7%  Ideal Body Weight (#):(5'11\" 176 lbs) BMI 25 182.6 lbs   Goal Weight (#):220 lbs   Highest: 269 lbs   Lowest: 210 lbs     Weight Loss History  Previous weight loss attempts: Counseling with  MD  Exercise  Self Created Diets (Portion Control, Healthy Food Choices, etc.)  Wegovy  Ozempic  metformin    Food and Nutrition Related History  Wake up: 8:30-9:00   Bed Time:12:00    Food Recall  Breakfast:9:30: 2 eggs, 2 wheat bread, turkey sausage, 1/2 piece cheese, diet coke    Snack:skip OR fruit  Lunch:12-12:30: s/w on 647 bread, deli turkey 3 slices, snack bag chips, 1 cup skim milk, water  Snack:2:00: soup OR protein bar 20 gm  4 p.m. 1 piece 647, 1 tbsp pb, 2 unmeasured pretzels, diet coke caffeine  Dinner:6-6:30: ~6 oz protein, potato, veggies  Snack:9:00: premier protein  12:00: yasso bar, snack bag pretzels or chips     Beverages: water, skim milk, and diet soda  Volume of beverage intake: 24 oz water, 36 oz " diet caffeine free coke, 1 cup milk, 1 cup coffee    Weekends: Same  Cravings: carbs   Trouble area of day:midday & nighttime 9 p.m. on    Frequency of Eating out: varies  Food restrictions:n/a  Cooking: wife  Food Shopping: self    Physical Activity Intake  Activity:Walking and Strength Training  Frequency: several times/wk  Physical limitations/barriers to exercise: n/a    Estimated Needs  Energy  SECA: BMR:n/a      X 1.3 -1000 =  [address] [place] Energy Needs: BMR : 1881   1-2# loss weekly sedentary:  7206-1218            1-2# loss weekly lightly active:4498-3110  Maintenance calories for sedentary activity level: 2297  Protein: gm      (1.2-1.5g/kg IBW)  Fluid: 93 oz     (35mL/kg IBW)    Nutrition Diagnosis  Yes;    Overweight/obesity  related to Excess energy intake as evidenced by  BMI more than normative standard for age and sex (obesity-grade I 30-34.9)       Nutrition Intervention    Nutrition Prescription  Calories:3647-2426  Protein:105-125 gm    Meal Plan (Rah/Pro)  Breakfast: 400, 25-30  Snack: skip  Lunch: 400, 25  Snack: 200, 5-10  Dinner: 500-550, 45  Snack: 200-250, 5-15    Nutrition Education:    Calorie controlled menu  Lean protein food choices  Healthy snack options  Food journaling tips  Protein bars    Nutrition Counseling:  Strategies: meal planning, portion sizes, healthy snack choices, hydration, fiber intake, protein intake, exercise, food journal    Monitoring and Evaluation:  Evaluation criteria:  Energy Intake  Meet protein needs  Maintain adequate hydration  Monitor weekly weight  Meal planning/preparation  Food journal   Decreased portions at mealtimes and snacks  Physical activity     Barriers to learning:none  Readiness to change: Action:  (Changing behavior)  Comprehension: good  Expected Compliance: good

## 2024-11-06 ENCOUNTER — CLINICAL SUPPORT (OUTPATIENT)
Dept: BARIATRICS | Facility: CLINIC | Age: 69
End: 2024-11-06

## 2024-11-06 VITALS — WEIGHT: 242.2 LBS | HEIGHT: 71 IN | BODY MASS INDEX: 33.91 KG/M2

## 2024-11-06 DIAGNOSIS — R63.5 ABNORMAL WEIGHT GAIN: Primary | ICD-10-CM

## 2024-11-06 PROCEDURE — RECHECK

## 2024-11-14 DIAGNOSIS — R73.03 PREDIABETES: ICD-10-CM

## 2024-11-14 RX ORDER — METFORMIN HYDROCHLORIDE 750 MG/1
1500 TABLET, EXTENDED RELEASE ORAL
Qty: 180 TABLET | Refills: 0 | Status: SHIPPED | OUTPATIENT
Start: 2024-11-14

## 2025-01-22 LAB — HBA1C MFR BLD: 6.1 % OF TOTAL HGB

## 2025-02-10 DIAGNOSIS — R73.03 PREDIABETES: ICD-10-CM

## 2025-02-10 RX ORDER — METFORMIN HYDROCHLORIDE 750 MG/1
1500 TABLET, EXTENDED RELEASE ORAL
Qty: 180 TABLET | Refills: 0 | Status: SHIPPED | OUTPATIENT
Start: 2025-02-10

## 2025-03-05 ENCOUNTER — CLINICAL SUPPORT (OUTPATIENT)
Dept: BARIATRICS | Facility: CLINIC | Age: 70
End: 2025-03-05

## 2025-03-05 VITALS — BODY MASS INDEX: 34.01 KG/M2 | WEIGHT: 242.9 LBS | HEIGHT: 71 IN

## 2025-03-05 DIAGNOSIS — R63.5 ABNORMAL WEIGHT GAIN: Primary | ICD-10-CM

## 2025-03-05 PROCEDURE — RECHECK

## 2025-03-06 ENCOUNTER — OFFICE VISIT (OUTPATIENT)
Age: 70
End: 2025-03-06
Payer: COMMERCIAL

## 2025-03-06 VITALS
SYSTOLIC BLOOD PRESSURE: 142 MMHG | HEIGHT: 71 IN | HEART RATE: 77 BPM | BODY MASS INDEX: 34.64 KG/M2 | WEIGHT: 247.4 LBS | TEMPERATURE: 98.5 F | DIASTOLIC BLOOD PRESSURE: 70 MMHG

## 2025-03-06 DIAGNOSIS — G47.30 SLEEP APNEA WITH USE OF CONTINUOUS POSITIVE AIRWAY PRESSURE (CPAP): ICD-10-CM

## 2025-03-06 DIAGNOSIS — E66.811 OBESITY, CLASS I, BMI 30-34.9: Primary | ICD-10-CM

## 2025-03-06 DIAGNOSIS — E78.5 HYPERLIPIDEMIA: ICD-10-CM

## 2025-03-06 DIAGNOSIS — R73.03 PREDIABETES: ICD-10-CM

## 2025-03-06 DIAGNOSIS — E66.811 OBESITY, CLASS I, BMI 30-34.9: ICD-10-CM

## 2025-03-06 PROCEDURE — 99213 OFFICE O/P EST LOW 20 MIN: CPT | Performed by: PHYSICIAN ASSISTANT

## 2025-03-06 RX ORDER — TIRZEPATIDE 2.5 MG/.5ML
2.5 INJECTION, SOLUTION SUBCUTANEOUS WEEKLY
Qty: 2 ML | Refills: 0 | Status: SHIPPED | OUTPATIENT
Start: 2025-03-06 | End: 2025-03-06 | Stop reason: SDUPTHER

## 2025-03-06 RX ORDER — TIRZEPATIDE 2.5 MG/.5ML
2.5 INJECTION, SOLUTION SUBCUTANEOUS WEEKLY
Refills: 0 | OUTPATIENT
Start: 2025-03-06

## 2025-03-06 RX ORDER — TIRZEPATIDE 2.5 MG/.5ML
2.5 INJECTION, SOLUTION SUBCUTANEOUS WEEKLY
Qty: 2 ML | Refills: 0 | Status: SHIPPED | OUTPATIENT
Start: 2025-03-06 | End: 2025-04-03

## 2025-03-06 NOTE — PROGRESS NOTES
Assessment/Plan:  1. Obesity, Class I, BMI 30-34.9  DISCONTINUED: tirzepatide (Zepbound) 2.5 mg/0.5 mL auto-injector      2. Prediabetes        3. Hyperlipidemia        4. Sleep apnea with use of continuous positive airway pressure (CPAP)  tirzepatide (Zepbound) 2.5 mg/0.5 mL auto-injector            Initial: 265  lbs (8/2023)  Previous:246 lbs (8/27/24)  Current weight: 247 lbs BMI 34. 51 (3/6/25)  Change: -18 lbs   Goal: 220 lbs    - Weight not at goal  - Patient is interested in Conservative Program  - Labs reviewed: As below.    General Recommendations:  Nutrition:  Eat breakfast daily.  Do not skip meals.     Food log (ie.) www.GEO'Supp.com, sparkpeople.com, loseit.com, calorieking.com, etc.    Practice mindful eating.  Be sure to set aside time to eat, eat slowly, and savor your food.    Hydration:    At least 64oz of water daily.  No sugar sweetened beverages.  No juice (eat the fruit instead).    Exercise:  Studies have shown that the ideal exercise goal is somewhere between 150 to 300 minutes of moderate intensity exercise a week.  Start with exercising 10 minutes every other day and gradually increase physical activity with a goal of at least 150 minutes of moderate intensity exercise a week, divided over at least 3 days a week.  An example of this would be exercising 30 minutes a day, 5 days a week.  Resistance training can increase muscle mass and increase our resting metabolic rate.   FULL BODY resistance training is recommended 2-3 times a week.  Do not do this on consecutive days to allow for muscle recovery.    Aim for a bare minimum 5000 steps, even on days you do not exercise.    Monitoring:   Weigh yourself daily.  If this causes undue stress, then just weigh yourself once a week.  Weigh yourself the same time of the day with the same amount of clothing on.  Preferably this should be done after waking up, before you eat, and with no clothing or minimal clothing on.    Nutrition  Prescription  Calories:9490-9277  Protein:105-125 gm    Return visit:    Medical record request from patient's sleep medicine referral  Calorie tracking/deficit  Physical activity goals - keep up the good work!      AOM   Previously took topamax without improvement  Currently managed on metformin 1500mg QD. Hemoglobin A1c remains stable at 6.1  Patient is a good candidate for GLP1 RA for treatment of his severe LILY  We discussed zepbound. Use and side effect profile reviewed  - Patient denies personal history of pancreatitis. Patient also denies personal and family history of thyroid cancer and multiple endocrine neoplasia type 2 (MEN 2 tumor).   RTC in 4 months  ______________________________________________________________________    Subjective:   Chief Complaint   Patient presents with    Follow-up     Mwm 6mth f/u     Patient here to discuss weight associated problems and nutrition goals  HPI: Leopoldo Fishman  is a 69 y.o. male with history of diabetes, LILY, and excess weight.  Weight loss plan:  Conservative Program.   Most recent notes and records were reviewed.    Patient was previously on Wegovy which was titrated up to 1.7 mg weekly then switched to Ozempic due to change in insurance formulary.  He reported constipation and GERD as side effects.  Also not effective for weight loss.  He was started on metformin on 1/31/2023 which was titrated up to 1500 mg daily.  He was tried on topiramate which was titrated up to 50 mg twice a day without any improvement in appetite, craving or weight reduction.  This medication was since stopped.  Hg A1C now rising from 5.9 % on 12/27/23 to now 6.2% on 5/15/2024.  Increase protein and reduced carbs.       Dietary - increased his protein, decreased starch. Portions  things out with 100 ilya packs. Increased water.  Exercise - walking daily, averages 10,000 steps/day. Cutting grass push mower. Going to [employer] 2-3x/week.  Pickle ball in [place].     No past history of MI or  "stroke    Patient reports a history of severe sleep apnea for many years  Following with sleep medicine  Continues to use CPAP for the last 15 years.   Follows with sleep medicine         Wt Readings from Last 10 Encounters:   03/06/25 112 kg (247 lb 6.4 oz)   03/05/25 110 kg (242 lb 14.4 oz)   11/06/24 110 kg (242 lb 3.2 oz)   08/27/24 112 kg (246 lb 3.2 oz)   07/17/24 113 kg (249 lb 9.6 oz)   05/23/24 117 kg (258 lb 9.6 oz)   04/22/24 123 kg (270 lb 1.6 oz)   01/29/24 119 kg (263 lb 1.6 oz)   01/08/24 119 kg (263 lb)   03/21/24 118 kg (260 lb)     Initial: 265  lbs  Previous: 263  lbs  Current weight: 258 lbs  Change: -5 lbs  Goal: 220 lbs    Gained up to 270.  Met with dietician.  Lost 12 lbs in a month.    Review Of Systems:  Review of Systems   Constitutional:  Negative for activity change, appetite change, chills, fatigue and fever.   HENT:  Negative for trouble swallowing.    Respiratory:  Negative for cough and shortness of breath.    Cardiovascular:  Negative for chest pain, palpitations and leg swelling.   Gastrointestinal:  Negative for abdominal pain, constipation, diarrhea, nausea and vomiting.   Endocrine: Negative for cold intolerance and heat intolerance.   Genitourinary:  Negative for difficulty urinating and dysuria.   Musculoskeletal:  Negative for arthralgias, back pain, gait problem and myalgias.   Skin:  Negative for pallor and rash.   Neurological:  Negative for headaches.   Psychiatric/Behavioral:  Negative for dysphoric mood and suicidal ideas. The patient is not nervous/anxious.      Objective:  /70 (Patient Position: Sitting, Cuff Size: Standard)   Pulse 77   Temp 98.5 °F (36.9 °C) (Tympanic)   Ht 5' 11\" (1.803 m)   Wt 112 kg (247 lb 6.4 oz)   BMI 34.51 kg/m²   Physical Exam  Vitals and nursing note reviewed.   Constitutional:       General: He is not in acute distress.     Appearance: Normal appearance. He is not ill-appearing or diaphoretic.   Eyes:      General: No scleral " "icterus.  Cardiovascular:      Rate and Rhythm: Normal rate and regular rhythm.      Pulses: Normal pulses.      Heart sounds: Normal heart sounds. No murmur heard.  Pulmonary:      Effort: Pulmonary effort is normal. No respiratory distress.      Breath sounds: Normal breath sounds. No stridor. No wheezing or rhonchi.   Musculoskeletal:      Cervical back: Neck supple.      Right lower leg: No edema.      Left lower leg: No edema.   Lymphadenopathy:      Cervical: No cervical adenopathy.   Skin:     Capillary Refill: Capillary refill takes less than 2 seconds.      Findings: No lesion or rash.   Neurological:      Mental Status: He is alert and oriented to person, place, and time.      Gait: Gait normal.   Psychiatric:         Mood and Affect: Mood normal.         Behavior: Behavior normal.       Labs and Imaging  Recent labs and imaging have been personally reviewed.  No results found for: \"WBC\", \"HGB\", \"HCT\", \"MCV\", \"PLT\"  No results found for: \"NA\", \"SODIUM\", \"K\", \"CL\", \"CO2\", \"ANIONGAP\", \"AGAP\", \"BUN\", \"CREATININE\", \"GLUC\", \"GLUF\", \"CALCIUM\", \"AST\", \"ALT\", \"ALKPHOS\", \"PROT\", \"TP\", \"BILITOT\", \"TBILI\", \"EGFR\"  Lab Results   Component Value Date    HGBA1C 6.1 (H) 01/21/2025       "

## 2025-03-07 ENCOUNTER — TELEPHONE (OUTPATIENT)
Age: 70
End: 2025-03-07

## 2025-03-07 RX ORDER — TIRZEPATIDE 2.5 MG/.5ML
2.5 INJECTION, SOLUTION SUBCUTANEOUS WEEKLY
Refills: 0 | OUTPATIENT
Start: 2025-03-07 | End: 2025-04-04

## 2025-03-12 ENCOUNTER — TELEPHONE (OUTPATIENT)
Dept: BARIATRICS | Facility: CLINIC | Age: 70
End: 2025-03-12

## 2025-03-17 ENCOUNTER — HOSPITAL ENCOUNTER (OUTPATIENT)
Dept: RADIOLOGY | Age: 70
Discharge: HOME | End: 2025-03-17
Payer: COMMERCIAL

## 2025-03-17 DIAGNOSIS — E04.1 NONTOXIC SINGLE THYROID NODULE: ICD-10-CM

## 2025-03-17 DIAGNOSIS — R91.8 OTHER NONSPECIFIC ABNORMAL FINDING OF LUNG FIELD: ICD-10-CM

## 2025-03-17 PROCEDURE — 71250 CT THORAX DX C-: CPT

## 2025-03-17 PROCEDURE — 76536 US EXAM OF HEAD AND NECK: CPT

## 2025-04-02 ENCOUNTER — APPOINTMENT (OUTPATIENT)
Dept: RADIOLOGY | Facility: MEDICAL CENTER | Age: 70
End: 2025-04-02
Payer: COMMERCIAL

## 2025-04-02 ENCOUNTER — OFFICE VISIT (OUTPATIENT)
Dept: OBGYN CLINIC | Facility: MEDICAL CENTER | Age: 70
End: 2025-04-02

## 2025-04-02 VITALS — BODY MASS INDEX: 33.94 KG/M2 | WEIGHT: 242.4 LBS | HEIGHT: 71 IN

## 2025-04-02 DIAGNOSIS — M25.561 RIGHT KNEE PAIN, UNSPECIFIED CHRONICITY: ICD-10-CM

## 2025-04-02 DIAGNOSIS — R73.03 PREDIABETES: ICD-10-CM

## 2025-04-02 DIAGNOSIS — Z01.89 ENCOUNTER FOR LOWER EXTREMITY COMPARISON IMAGING STUDY: ICD-10-CM

## 2025-04-02 DIAGNOSIS — Z01.89 ENCOUNTER FOR LOWER EXTREMITY COMPARISON IMAGING STUDY: Primary | ICD-10-CM

## 2025-04-02 PROCEDURE — 73560 X-RAY EXAM OF KNEE 1 OR 2: CPT

## 2025-04-02 PROCEDURE — 73564 X-RAY EXAM KNEE 4 OR MORE: CPT

## 2025-04-02 NOTE — PROGRESS NOTES
Name: Leopoldo Fishman      : 1955      MRN: [id number]  Encounter Provider: Sandi Limon DO  Encounter Date: 2025   [facility]  :  Assessment & Plan  Right knee pain, unspecified chronicity    Orders:    XR knee 4+ vw right injury; Future    Encounter for lower extremity comparison imaging study    Orders:    XR knee 1 or 2 vw left; Future    Prediabetes               Patient has moderate to severe right knee osteoarthritis.   Treatment options were discussed with patient at today's visit.    Patient is a surgical candidate at this time.  However not interested in surgery as last cortisone lasted a year did request repeat steroid injection today.  Injections: Patient received right knee steroid injection today. Tolerated the procedure well. Post injection instructions reviewed including information on glucose monitoring for diabetic patients. Patient aware that they may repeat steroid injection every 3 months if needed.   Medications: OTC NSAID prn pain  PT:  Declined  Bracing:  OTC sleeve and hinged knee brace.  Activity: Continue activity as tolerated.   Brochure was given for viscosupplementation therapy he will consider this.  Plan for next appt:  As needed to discuss repeat cortisone versus gel injections    Return for Patient will call if he would like repeat cortisone versus gel injections.    I answered all of the patient's questions during the visit and provided education of the patient's condition during the visit.  The patient verbalized understanding of the information given and agrees with the plan.  This note was dictated using [employer] software.  It may contain errors including improperly dictated words.  Please contact physician directly for any questions.    History of Present Illness   HPI  Chief complaint:   Chief Complaint   Patient presents with    Right Knee - Pain       HPI: Leopoldo Fishman is a 69 y.o.  male that c/o right knee pain.    Length of time knee pain has been present: 2 years  Any falls or trauma associated with onset of pain: None  Location of pain: Medial knee  Intermittent or constant: Intermittent  Description of pain: Occasional giving way  Aggravating factors: Activity  Instability or locking: None  Pain medication that has been tried: Does not take anything on a regular basis  Topical mediation that has been tried: In the past  Has heat/ice/elevation been tried: None  Can NSAIDs be taken?  If not why?:  Yes  Has PT or home exercises been tried?:  No  Has bracing been tried? OTC or rx?  OTC sleeve and hinged knee brace  Have injections been tried?  Steroid/visco?:  Cortisone injection 1 year Dr. Peters at [facility] lasted 1 year  Any history of surgery on that knee?:  None       ROS:    See HPI for musculoskeletal review.   All other systems reviewed are negative     Historical Information   Past Medical History:   Diagnosis Date    LILY (obstructive sleep apnea)     Plantar wart     Verruca     planters warts     Past Surgical History:   Procedure Laterality Date    TOENAIL EXCISION      grew back    TONSILLECTOMY       Social History   Social History     Substance and Sexual Activity   Alcohol Use Yes    Alcohol/week: 10.0 standard drinks of alcohol    Types: 10 Cans of beer per week    Comment: per week     Social History     Substance and Sexual Activity   Drug Use Never     Social History     Tobacco Use   Smoking Status Former    Current packs/day: 0.00    Average packs/day: 1.5 packs/day for 48.1 years (72.2 ttl pk-yrs)    Types: Cigarettes    Start date: 1970    Quit date: 2018    Years since quittin.6   Smokeless Tobacco Never     Family History: No family history on file.    Current Outpatient Medications on File Prior to Visit   Medication Sig Dispense Refill    atorvastatin (LIPITOR) 10 mg tablet       clobetasol (OLUX) 0.05 % topical foam as needed      clobetasol  (TEMOVATE) 0.05 % GEL Apply 1 application. topically as needed To affected area (Patient not taking: Reported on 3/6/2025)      dexamethasone 0.5 mg/5 mL solution as needed      diclofenac sodium (VOLTAREN) 50 mg EC tablet Take 50 mg by mouth 2 (two) times a day as needed      doxycycline hyclate (VIBRAMYCIN) 50 mg capsule TAKE 1 CAPSULE BY MOUTH ONCE A DAY AS DIRECTED TAKE 1 CAPSULE BY MOUTH DAILY. (Patient not taking: Reported on 3/6/2025)      fluocinonide (LIDEX) 0.05 % gel 2 (two) times a day Apply sparingly to affected area (Patient not taking: Reported on 3/6/2025)      metFORMIN (GLUCOPHAGE-XR) 750 mg 24 hr tablet TAKE 2 TABLETS (1,500 MG TOTAL) BY MOUTH DAILY WITH BREAKFAST DO NOT START BEFORE AUGUST 15, 2024. 180 tablet 0    nystatin (MYCOSTATIN) 500,000 units/5 mL suspension as needed      tirzepatide (Zepbound) 2.5 mg/0.5 mL auto-injector Inject 0.5 mL (2.5 mg total) under the skin once a week for 28 days 2 mL 0    Vtama 1 % CREA Apply 1 Application topically if needed       No current facility-administered medications on file prior to visit.     No Known Allergies    Current Outpatient Medications on File Prior to Visit   Medication Sig Dispense Refill    atorvastatin (LIPITOR) 10 mg tablet       clobetasol (OLUX) 0.05 % topical foam as needed      clobetasol (TEMOVATE) 0.05 % GEL Apply 1 application. topically as needed To affected area (Patient not taking: Reported on 3/6/2025)      dexamethasone 0.5 mg/5 mL solution as needed      diclofenac sodium (VOLTAREN) 50 mg EC tablet Take 50 mg by mouth 2 (two) times a day as needed      doxycycline hyclate (VIBRAMYCIN) 50 mg capsule TAKE 1 CAPSULE BY MOUTH ONCE A DAY AS DIRECTED TAKE 1 CAPSULE BY MOUTH DAILY. (Patient not taking: Reported on 3/6/2025)      fluocinonide (LIDEX) 0.05 % gel 2 (two) times a day Apply sparingly to affected area (Patient not taking: Reported on 3/6/2025)      metFORMIN (GLUCOPHAGE-XR) 750 mg 24 hr tablet TAKE 2 TABLETS (1,500 MG  "TOTAL) BY MOUTH DAILY WITH BREAKFAST DO NOT START BEFORE AUGUST 15, 2024. 180 tablet 0    nystatin (MYCOSTATIN) 500,000 units/5 mL suspension as needed      tirzepatide (Zepbound) 2.5 mg/0.5 mL auto-injector Inject 0.5 mL (2.5 mg total) under the skin once a week for 28 days 2 mL 0    Vtama 1 % CREA Apply 1 Application topically if needed       No current facility-administered medications on file prior to visit.         Objective   Ht 5' 11\" (1.803 m)   Wt 110 kg (242 lb 6.4 oz)   BMI 33.81 kg/m²        PE:  AAOx 3  WDWN  Hearing intact, no drainage from eyes  Regular rate  no audible wheezing  no abdominal distension  LE compartments soft, skin intact    Ortho Exam:  rightknee:    Appearance:  no swelling   No bruising  no obvious joint deformity   No effusion  Palpation/Tenderness:  +TTP over medial joint line, no TTP over lateral joint line or over patella/patellar tendon  Active Range of Motion:  AROM: 0-1 30  Special Tests:  Medial Marlena's Test:  Positive  Lateral Marlena's Test:  Negative  Apley's compression test:  Negative  Lachman's Test:  negative  Anterior Drawer Test:  negative  Valgus Stress Test:  negative  Varus Stress Test:  negative       No ipsilateral hip pain with ROM    rightLE:    Sensation grossly intact  Palpable DP pulse  AT/GS/EHL intact    Imaging Studies: Results Review Statement: I personally reviewed the following image studies in PACS and associated radiology reports: xray(s). My interpretation of the radiology images/reports is: 3 views right knee show moderate to severe varus OA with medial joint space narrowing.  No fracture or dislocation small bone spur MTP and MFC..    MRI report was reviewed of MRI right knee which did show complex medial meniscal tear and near full-thickness cartilage loss medial tibial plateau  Pleat    Procedures     Scribe Attestation      I,:   am acting as a scribe while in the presence of the attending physician.:       I,:   personally performed " the services described in this documentation    as scribed in my presence.:

## 2025-04-11 ENCOUNTER — TELEPHONE (OUTPATIENT)
Age: 70
End: 2025-04-11

## 2025-04-11 DIAGNOSIS — G47.30 SLEEP APNEA WITH USE OF CONTINUOUS POSITIVE AIRWAY PRESSURE (CPAP): ICD-10-CM

## 2025-04-11 DIAGNOSIS — E66.811 OBESITY, CLASS I, BMI 30-34.9: ICD-10-CM

## 2025-04-11 DIAGNOSIS — E66.811 OBESITY, CLASS I, BMI 30-34.9: Primary | ICD-10-CM

## 2025-04-11 RX ORDER — TIRZEPATIDE 2.5 MG/.5ML
2.5 INJECTION, SOLUTION SUBCUTANEOUS WEEKLY
Refills: 0 | OUTPATIENT
Start: 2025-04-11 | End: 2025-05-09

## 2025-04-11 RX ORDER — TIRZEPATIDE 2.5 MG/.5ML
2.5 INJECTION, SOLUTION SUBCUTANEOUS WEEKLY
Qty: 2 ML | Refills: 0 | Status: SHIPPED | OUTPATIENT
Start: 2025-04-11 | End: 2025-05-09

## 2025-04-11 NOTE — TELEPHONE ENCOUNTER
PA for Zepbound APPEALED via [facility] [employer] [phone]    Case ID# [id number] spoke to Gino faxed over sleep study and all clinical documentation. Urgent Review turn around time 72 hours    []CMM  []SS  []Letter sent to insurance via fax   []Other site or means   All necessary records sent. Will await response from insurance company    Turnaround time for a decision to be made on an appeal could take up to 30 business days

## 2025-04-13 ENCOUNTER — PATIENT MESSAGE (OUTPATIENT)
Age: 70
End: 2025-04-13

## 2025-04-22 ENCOUNTER — TELEPHONE (OUTPATIENT)
Dept: SLEEP CENTER | Facility: CLINIC | Age: 70
End: 2025-04-22

## 2025-04-22 NOTE — TELEPHONE ENCOUNTER
Called patient to bring cpap and prior sleep study to visit tomorrow.     Patient verbalized understanding.

## 2025-04-23 ENCOUNTER — OFFICE VISIT (OUTPATIENT)
Dept: SLEEP CENTER | Facility: CLINIC | Age: 70
End: 2025-04-23
Payer: COMMERCIAL

## 2025-04-23 VITALS
WEIGHT: 243.6 LBS | DIASTOLIC BLOOD PRESSURE: 60 MMHG | BODY MASS INDEX: 34.1 KG/M2 | SYSTOLIC BLOOD PRESSURE: 114 MMHG | HEIGHT: 71 IN

## 2025-04-23 DIAGNOSIS — G47.33 OSA (OBSTRUCTIVE SLEEP APNEA): Primary | ICD-10-CM

## 2025-04-23 DIAGNOSIS — E66.811 CLASS 1 OBESITY WITH SERIOUS COMORBIDITY AND BODY MASS INDEX (BMI) OF 33.0 TO 33.9 IN ADULT, UNSPECIFIED OBESITY TYPE: ICD-10-CM

## 2025-04-23 PROCEDURE — 99204 OFFICE O/P NEW MOD 45 MIN: CPT | Performed by: STUDENT IN AN ORGANIZED HEALTH CARE EDUCATION/TRAINING PROGRAM

## 2025-04-23 PROCEDURE — G2211 COMPLEX E/M VISIT ADD ON: HCPCS | Performed by: STUDENT IN AN ORGANIZED HEALTH CARE EDUCATION/TRAINING PROGRAM

## 2025-04-23 NOTE — PATIENT INSTRUCTIONS
Plan:  Continue AutoPAP at settings of  8.5-20 cmH2O  As we discussed today, you are eligible for  anew device; I have placed the order for this at the above settings.  You will ultimately receive your machine and regular supplies from a DME (durable medical equipment) company.   After your visit today, at the  you will discuss the DME options and select your preferred supplier. My prescription will then be sent to the DME of your choice, and they should be reaching out to you within a few weeks to schedule initial device set-up.  If not done after your visit today, please call our [place] office ([phone] option 1, then option 3) to do so.  You should be eligible for new supplies approximately every 3-6 months, depending on your insurance coverage.  Insurance requires a follow-up in the office within 90 days of starting your new PAP device.  This should have been scheduled today, otherwise can be scheduled when you call the office number listed above.  Remember to clean your mask and equipment regularly, as directed.  You should be eligible for new supplies approximately every 3-6 months, depending on your insurance coverage. Contact your Durable Medical Equipment (DME) company for new supplies as needed.  Practice good Sleep Hygiene, as outlined below.  Follow up in ~2 months.      General PAP Information:  Care and Maintenance  Headgear should be washed as needed. Daily inspection and weekly washings are recommended. Do not disassemble the straps. Machine wash in warm water, making sure to attach Velcro hooks and tabs before washing. Line dry or machine dry on a low setting.  Masks should be washed every day. Daily inspection is recommended. Leave the mask and tubing attached. Gently wash the mask with a soft cloth using warm water and mild detergent, concentrating on the mask cushion flaps. DO NOT use alcohol or bleach. Rinse thoroughly and air dry.  Tubing and headgear should be washed weekly.  Daily inspection is recommended. Wash in warm water and mild detergent and rinse thoroughly. Hook the tubing to the machine and blow until dry.  Humidifier should be washed daily and filled with DISTILLED water before use. Wash with warm water and mild detergent. Rinse thoroughly and air dry.  Disposable filters should be replaced once a month. Wash reusable foam filters with warm water and mild detergent at least once a month. Rinse thoroughly and dry with paper towels.  Avoid  that contain fragrance or conditioners, as these will leave a residue.  NEVER iron any soft goods.      CMS Requirements    Your insurance requires a face-to-face follow up visit within a 31-90 day period after starting CPAP.  Your insurance requires compliance with CPAP, which is at least 4 hours per night for 70% of the time. This must be done over a 30 day period and must occur within the initial 31-90 day period after starting CPAP.  Your insurance also requires at least yearly follow ups to continue to pay for CPAP supplies.       PAP Supply Guidelines    Below are the guidelines for reordering your supplies. You will be responsible for your deductible, co payments, and out of pocket expenses.    Item Frequency   Nasal Mask (no headgear) 1 every 3 months   Nasal Mask Cushion 1 every 2 weeks   Full Face Mask (no headgear) 1 every 3 months   Full Face Mask Cushion 1 every month   Nasal Pillows 1 every 2 weeks   Headgear 1 every 6 months   hin Strap 1 every 6 months   kena 1 every 3 months   Filters: Reusable 1 every 6 months   Filters: Disposable 1 every 2 weeks   Humidifier Chamber(disposable) 1 every 6 months           Good Sleep Hygiene  Wake up at the same time every day, even on the weekends.  Use your bed for sleep and intimacy only.  If you have been in bed awake for 30 minutes, get up and leave the bedroom. Choose a dull activity not involving a blue screen (TV, computer, handheld devices). Go back to bed when you feel  sleepy.  Avoid caffeine, nicotine and alcohol before you go to bed.  Avoid large meals before you go to bed.  Avoid using screens (computers, tablets, smartphones, etc.) for at least 1 hour before bedtime  Exercise regularly, but do not exercise right before you go to bed.  Avoid daytime naps. If you do take a nap, sleep for 20-40 minutes, and not after 2pm.       Zepbound (tirzepatide) for LILY  Tirzepatide is a joint GIP and GLP-1 agonist medication, now approved for several clinical indications.  It was originally developed as a treatment for type 2 diabetes mellitus, under the brand name Mounjaro.  Following significant weight loss with this medication, it was further researched, developed, and rebranded for weight loss under the name Zepbound.  Zepbound was then further studied for the treatment of obstructive sleep apnea, and patient's both with and without PAP therapy, and found to have clinically significant improvements in both groups.  In patients not on PAP therapy (study 1), Zepbound resulted in an average 50.7% reduction in AHI from baseline.  In patients on PAP therapy (study 2), Zepbound resulted in an average 58.7% reduction in AHI from baseline.  Furthermore, 42.2% of patients in study 1 and 50.2% of patients in study 2 achieved either a normal AHI (AHI<5) or what was deemed clinically insignificant mild LILY (AHI<15 with minimal to no symptoms)      Use:  As such, it is now FDA approved for the treatment of moderate to severe obstructive sleep apnea (AHI >15 events per hour) in patients with obesity (BMI>30)  Zepbound for LILY is dosed at at either 10 mg or 15 mg weekly.  Titration is started at 5 mg weekly for 4 weeks, then increased by 2.5 mg per week in 4-week increments (5 mg weekly for 4 weeks, then 7.5 mg weekly for 4 weeks, then 10 mg weekly for 4 weeks, etc.) to final tolerated dose.      Potential adverse effects include diarrhea, nausea, constipation, reflux/heartburn, abdominal pain, upper  abdominal pain, and injection site reactions.  Reassuringly, in the majority of patients, these side effects appear (if at all) at the time of an increase in dosage, and resolve with time.  Rarely, pharmacologic interventions to help with nausea may be transiently necessary.  Also reassuringly, despite this being originally developed as a treatment for type 2 diabetes mellitus, due to the actual mechanism of action, hypoglycemic events on this medication are very rare.  It is recommended that patients on oral contraception abstain from sexual activity and/or pursue an alternate means of contraception for 4 weeks after initiation and/or a dose increase of this medication, as Zepbound can reduce the efficacy of these medications for those initial 4 weeks.    Note that as of now, this medication is lifelong, as, very frequently, patients do typically notice a rebound weight gain after stopping this medication.  Additional recommendations:  Patients often note a loss of appetite (the primary effect resulting in weight loss), thus it is important to maintain healthy eating habits  Ensure that you drink water regularly and stay hydrated  Recommend eating smaller, regular meals with higher protein than carbohydrate content  Recommend regular exercise  We may discuss a formal referral to weight management to help with these lifestyle interventions if necessary.    Patients already on PAP Therapy can continue utilizing PAP after initiation of Zepbound, although pressure adjustments may be necessary.  We likely will pursue an in-lab polysomnogram (PSG) (preferred over Home Sleep Apnea Tests (HSATs) due to the latter's risk of fals-negative results) after reaching a weight loss plateau.    Additional information can be discussed with your healthcare provider or found on the [employer]'s website below:  [email or web address]

## 2025-04-23 NOTE — ASSESSMENT & PLAN NOTE
Discussed the importance of maintaining a healthy weight on SDB control/management, and vice versa.  Encouraged lifestyle practices to maintain a healthy weight (including diet, exercise).  Reviewed the medication Zepbound, including indications and potential side effects of use   Continue to follow with Weight Management per their recommendations    Orders:    Cpap DME

## 2025-04-23 NOTE — PROGRESS NOTES
Name: Leopoldo Fishman      : 1955      MRN: [id number]  Encounter Provider: Eros Garcia DO  Encounter Date: 2025   [facility]  :  CONSULTING PROVIDER:  No referring provider defined for this encounter.    Assessment & Plan  LILY (obstructive sleep apnea)  Leopoldo is a very pleasant 69-year-old gentleman with PMHx of HLD, obesity, prediabetes who presents for LILY (AHI 42.2, O2 elio 84% 2013).  He is overall doing very well with PAP therapy after his initial diagnosis, however he is certainly eligible for a new device.    Compliance report reviewed and analyzed  Continue AutoPAP at settings of  8.5-20 cmH2O  New device ordered at the above settings given that [facility] has shut down their sleep department,  Prescription for new supplies ordered today  Reviewed CMS/insurance requirements and resupply guidelines  Information provided on the above as well as general maintenance steps  Recommended maintaining good Sleep Hygiene    Orders:    Cpap DME    Class 1 obesity with serious comorbidity and body mass index (BMI) of 33.0 to 33.9 in adult, unspecified obesity type    Discussed the importance of maintaining a healthy weight on SDB control/management, and vice versa.  Encouraged lifestyle practices to maintain a healthy weight (including diet, exercise).  Reviewed the medication Zepbound, including indications and potential side effects of use   Continue to follow with Weight Management per their recommendations    Orders:    Cpap DME        Follow-up:  He will Return in about 2 months (around 2025) for Compliance Visit.    Thank you for allowing me to participate in the care of your patient.  If there are any questions regarding evaluation please feel free to reach out.       ________________________________________________________________________________________________    Subjective:     HPI: Leopoldo Fishman is a 69 y.o. male with PMHx of  "HLD, obesity, prediabetes who presents for LILY (AHI 42.2, O2 elio 84% 6/2013).    He was diagnosed with LILY as noted above in 2013, and has been utilizing CPAP ever since.    SDB:  -Current experience with PAP Therapy: Definite benefit from the device. Looking into a new device.   -Mask type: Nasal   -Difficulties with mask: Occasionally feeling like he is \"spitting air.\" Generally not an issue, this occurs rarely.  -Device: Treatful; replacement. Original received >5 years ago.  -Difficulties with device: Sometimes the latch to the water chamber opens in the middle of the night on its own.  -DME: [facility]  -Compliance:            SLEEP-WAKE SCHEDULE  Sleep Schedule:  Weekdays:  Bedtime: 0000   Asleep in <15 minutes   Nighttime awakenings: 1-2     Wake: 0830    Naps: 1, for 45-50 minutes    Average total sleep time (in a 24 hour period): ~8 hours.    Weekends:  Same    Sleep-Related Details:  Preferred Sleep Position: side(s)  SDB Symptoms: None wityh device; was snoring an other SDB symptoms prior to CPAP  Sleep-Related Hallucinations: No   Sleep Paralysis: No     RLS Symptoms: Denies    Parasomnias:  He denies any parasomnia activity.    Wake-Related Details:  Daytime Sleepiness: No   Work Schedule: Retired  Caffeine: Yes, diet coke in am  Alcohol Use: Yes, 10 beers 1x/week  Substance Use: No  Tobacco Use: No  Weight Change: See above    He does not have difficulty with memory or concentration.      PAST TREATMENTS:  CPAP, see above    PRIOR SLEEP STUDIES:  -Split Night 6/7/2013: BMI: --. DIAGNOSTIC: TST: 123.5 minutes, Sleep efficiency: 77%. Sleep Onset Latency: 23 minutes, REM Onset Latency: 94 minutes. AHI: 42.2, Supine AHI: --, REM AHI: --. O2 elio: 84%, Time below 90%: -- minutes. PLM Index: 75.8, PLM Arousal Index: --. THERAPEUTIC: Titration started at -- cmH2O, titrated to 9 cmH2O. Best pressure noted to be 9 cmH2O.      OTHER RELEVANT LABS AND STUDIES:  -None         Sitting and " "reading: (Patient-Rptd) Would never doze  Watching TV: (Patient-Rptd) Would never doze  Sitting, inactive in a public place (e.g. a theatre or a meeting): (Patient-Rptd) Would never doze  As a passenger in a car for an hour without a break: (Patient-Rptd) Would never doze  Lying down to rest in the afternoon when circumstances permit: (Patient-Rptd) High chance of dozing  Sitting and talking to someone: (Patient-Rptd) Would never doze  Sitting quietly after a lunch without alcohol: (Patient-Rptd) Would never doze  In a car, while stopped for a few minutes in traffic: (Patient-Rptd) Would never doze  Total score: (Patient-Rptd) 3     Review of Systems  Pertinent positives/negatives included in HPI and also as noted:       Objective   /60   Ht 5' 11\" (1.803 m)   Wt 110 kg (243 lb 9.6 oz)   BMI 33.98 kg/m²     Neck Circumference: 16  Physical Exam  PHYSICAL EXAMINATION:  Vital Signs:  /60   Ht 5' 11\" (1.803 m)   Wt 110 kg (243 lb 9.6 oz)   BMI 33.98 kg/m²  Body mass index is 33.98 kg/m².    Constitutional: NAD, well appearing   Mental Status: AAOx3  Neck Circumference: Neck Circumference: 16 inches  Skin: Warm, dry, no rashes noted   Eyes: PERRL, normal conjunctiva  Posterior Airspace:   Benz Tongue Position: 4  Retrognathia: absent  Overbite: absent  High Arched Palate: present  Tongue Scalloping/Ridging: absent  Uvula: normal  Chest: No evidence of respiratory distress, no accessory muscle use; no evidence of peripheral cyanosis  Abdomen: Soft, NT/ND  Extremities: No digital clubbing or pedal edema    NEUROLOGICAL EXAM:  General: Awake, alert, speech fluent, comprehension, naming, repetition intact. Short and long term memory intact.  CN: PERRL, EOMI without nystagmus, facial sensation and strength are normal and symmetric, hearing is intact to conversational tone, palate and tongue movements are intact and symmetric.  Motor: Normal tone, bulk and strength bilaterally.   Sensation: LT intact " throughout.  Gait: Able to walk without difficulty. Stance normal.            Electronically signed by:    Eros Garcia DO  Board-Certified Neurology and Sleep Medicine  [facility]  04/23/25

## 2025-04-23 NOTE — ASSESSMENT & PLAN NOTE
Leopoldo is a very pleasant 69-year-old gentleman with PMHx of HLD, obesity, prediabetes who presents for LILY (AHI 42.2, O2 elio 84% 6/2013).  He is overall doing very well with PAP therapy after his initial diagnosis, however he is certainly eligible for a new device.    Compliance report reviewed and analyzed  Continue AutoPAP at settings of 8.5-20 cmH2O  New device ordered at the above settings given that [facility] has shut down their sleep department,  Prescription for new supplies ordered today  Reviewed CMS/insurance requirements and resupply guidelines  Information provided on the above as well as general maintenance steps  Recommended maintaining good Sleep Hygiene    Orders:    Cpap DME

## 2025-04-24 ENCOUNTER — TELEPHONE (OUTPATIENT)
Dept: SLEEP CENTER | Facility: CLINIC | Age: 70
End: 2025-04-24

## 2025-04-24 LAB
DME PARACHUTE DELIVERY DATE REQUESTED: NORMAL
DME PARACHUTE ITEM DESCRIPTION: NORMAL
DME PARACHUTE ORDER STATUS: NORMAL
DME PARACHUTE SUPPLIER NAME: NORMAL
DME PARACHUTE SUPPLIER PHONE: NORMAL

## 2025-04-28 ENCOUNTER — CONSULT (OUTPATIENT)
Dept: ENDOCRINOLOGY | Facility: CLINIC | Age: 70
End: 2025-04-28
Payer: COMMERCIAL

## 2025-04-28 VITALS
BODY MASS INDEX: 34.52 KG/M2 | WEIGHT: 246.6 LBS | HEART RATE: 62 BPM | SYSTOLIC BLOOD PRESSURE: 112 MMHG | OXYGEN SATURATION: 98 % | HEIGHT: 71 IN | DIASTOLIC BLOOD PRESSURE: 70 MMHG

## 2025-04-28 DIAGNOSIS — G47.30 SLEEP APNEA WITH USE OF CONTINUOUS POSITIVE AIRWAY PRESSURE (CPAP): ICD-10-CM

## 2025-04-28 DIAGNOSIS — E04.1 THYROID NODULE: Primary | ICD-10-CM

## 2025-04-28 DIAGNOSIS — E66.811 OBESITY, CLASS I, BMI 30-34.9: Primary | ICD-10-CM

## 2025-04-28 PROCEDURE — 99204 OFFICE O/P NEW MOD 45 MIN: CPT | Performed by: INTERNAL MEDICINE

## 2025-04-28 RX ORDER — TIRZEPATIDE 5 MG/.5ML
5 INJECTION, SOLUTION SUBCUTANEOUS WEEKLY
Qty: 2 ML | Refills: 0 | Status: SHIPPED | OUTPATIENT
Start: 2025-04-28 | End: 2025-04-28 | Stop reason: SDUPTHER

## 2025-04-28 RX ORDER — TIRZEPATIDE 5 MG/.5ML
5 INJECTION, SOLUTION SUBCUTANEOUS WEEKLY
Qty: 2 ML | Refills: 2 | Status: SHIPPED | OUTPATIENT
Start: 2025-04-28 | End: 2025-07-21

## 2025-04-28 NOTE — LETTER
2025     Forest Pike DO  [address] [facility]  [address] [place] [address]    Patient: Leopoldo Fishman   YOB: 1955   Date of Visit: 2025       Dear Dr. Forest Pike DO:    Thank you for referring Leopoldo Fishman to me for evaluation. Below are my notes for this consultation.    If you have questions, please do not hesitate to call me. I look forward to following your patient along with you.         Sincerely,        Diane Goodwin MD        CC: No Recipients    Jazmin Larson MD  2025  3:24 PM  Sign when Signing Visit  With a past medical historyName: Leopoldo Fsihman      : 1955      MRN: [id number]  Encounter Provider: Diane Goodwin MD  Encounter Date: 2025   Encounter department: [facility]OLOGY [place]    No chief complaint on file.  :  Assessment & Plan  Thyroid nodule  Patient with solid isoechoic left mid gland nodule measuring 4.5 X2.7X 2.9 cm with macrocalcifications (TI-RADS 4) noted on ultrasound on 3/17/2025.  No prior thyroid history, not currently taking levothyroxine.  No history of head and neck radiation.  No prior thyroid function testing available for review.  The nodule meets criteria for biopsy per the American Thyroid Association Management Guidelines for Adult Patients with Thyroid Nodules and Differentiated Thyroid Cancer.  Reviewed pathophysiology of thyroid nodules with patient  Will order baseline TSH as no prior labs on file.   Placed order for [facility] FNA  Discussed with patient possible results of fine needle aspiration.   Will reach out to discuss next steps once FNA results are back.   Next scheduled follow up in 6 months  Orders:  •  US guided thyroid biopsy with molecular testing; Future  •  TSH, 3rd generation with Free T4 reflex; Future        History of Present Illness{?Quick Links Encounters * My Last Note * Last Note in Specialty * Snapshot * Since Last Visit * History  :35381}    Leopoldo Fishman is a 69 y.o. male with a PMH of thyroid nodules, prediabetes, hyperlipidemia, obesity, LILY on CPAP who presents for new patient visit for the management of thyroid nodules.  Nodules were incidentally discovered on CT lung screening program in September 2024 and patient was advised to obtain a thyroid ultrasound for additional evaluation.   Thyroid ultrasound performed on 3/17/2025 demonstrated a solid isoechoic left mid gland nodule measuring 4.5 X2.7X 2.9 cm with macrocalcifications.    He denies fatigue, cold intolerance, diarrhea or constipation. He notes losing 25lbs in the past 6 months intentionally, noting that he follows up with weight management and has been additionally started on Zepbound. Denies skin, hair or nail changes.   No sweats, tremors of palpitations.   He denies issues with sleep. Has a history of LILY and wears CPAP regularly.   He takes a zinc supplement 25mg daily, as well as B12 and fish oil.   Denies family history of thyroid issues or thyroid cancer. No history of head or neck radiation.   Additionally he denies compressive symptoms, difficulty swallowing, foreign body sensation, choking, difficulty breathing.   Patient had no other complaints at this time.        Review of Systems   Constitutional:  Negative for appetite change and unexpected weight change.   HENT:  Negative for congestion, trouble swallowing and voice change.    Eyes:  Negative for visual disturbance.   Respiratory:  Negative for cough, choking, shortness of breath and stridor.    Cardiovascular:  Negative for chest pain, palpitations and leg swelling.   Gastrointestinal:  Negative for abdominal pain, constipation, diarrhea, nausea and vomiting.   Endocrine: Negative for cold intolerance, heat intolerance, polydipsia and polyuria.   Genitourinary:  Negative for frequency.   Musculoskeletal:  Negative for myalgias.   Skin:  Negative for rash.   Neurological:  Negative for dizziness, weakness,  light-headedness, numbness and headaches.   Psychiatric/Behavioral:  Negative for sleep disturbance.    All other systems reviewed and are negative.   as per HPI        Medical History Reviewed by provider this encounter:  Problems     .  Past Medical History  Past Medical History:   Diagnosis Date   • LILY (obstructive sleep apnea)    • Plantar wart    • Verruca 1972    planters warts     Past Surgical History:   Procedure Laterality Date   • TOENAIL EXCISION  2011    grew back   • TONSILLECTOMY  1959     History reviewed. No pertinent family history.   reports that he quit smoking about 6 years ago. His smoking use included cigarettes. He started smoking about 54 years ago. He has a 72.2 pack-year smoking history. He has never used smokeless tobacco. He reports current alcohol use of about 10.0 standard drinks of alcohol per week. He reports that he does not use drugs.  Current Outpatient Medications   Medication Instructions   • atorvastatin (LIPITOR) 10 mg tablet No dose, route, or frequency recorded.   • clobetasol (OLUX) 0.05 % topical foam As needed   • clobetasol (TEMOVATE) 0.05 % GEL As needed   • dexamethasone 0.5 mg/5 mL solution As needed   • diclofenac sodium (VOLTAREN) 50 mg, 2 times daily PRN   • doxycycline hyclate (VIBRAMYCIN) 50 mg capsule TAKE 1 CAPSULE BY MOUTH ONCE A DAY AS DIRECTED TAKE 1 CAPSULE BY MOUTH DAILY.   • fluocinonide (LIDEX) 0.05 % gel 2 times daily   • metFORMIN (GLUCOPHAGE-XR) 1,500 mg, Oral, Daily with breakfast   • nystatin (MYCOSTATIN) 500,000 units/5 mL suspension As needed   • Vtama 1 % CREA 1 Application, As needed   • Zepbound 2.5 mg, Subcutaneous, Weekly   No Known Allergies   Current Outpatient Medications on File Prior to Visit   Medication Sig Dispense Refill   • atorvastatin (LIPITOR) 10 mg tablet      • clobetasol (OLUX) 0.05 % topical foam as needed     • dexamethasone 0.5 mg/5 mL solution as needed     • diclofenac sodium (VOLTAREN) 50 mg EC tablet Take 50 mg by  "mouth 2 (two) times a day as needed     • metFORMIN (GLUCOPHAGE-XR) 750 mg 24 hr tablet TAKE 2 TABLETS (1,500 MG TOTAL) BY MOUTH DAILY WITH BREAKFAST DO NOT START BEFORE AUGUST 15, 2024. 180 tablet 0   • nystatin (MYCOSTATIN) 500,000 units/5 mL suspension as needed     • tirzepatide (Zepbound) 2.5 mg/0.5 mL auto-injector Inject 0.5 mL (2.5 mg total) under the skin once a week for 28 days 2 mL 0   • Vtama 1 % CREA Apply 1 Application topically if needed     • clobetasol (TEMOVATE) 0.05 % GEL Apply 1 application. topically as needed To affected area (Patient not taking: Reported on 2024)     • doxycycline hyclate (VIBRAMYCIN) 50 mg capsule TAKE 1 CAPSULE BY MOUTH ONCE A DAY AS DIRECTED TAKE 1 CAPSULE BY MOUTH DAILY. (Patient not taking: Reported on 2024)     • fluocinonide (LIDEX) 0.05 % gel 2 (two) times a day Apply sparingly to affected area (Patient not taking: Reported on 2024)       No current facility-administered medications on file prior to visit.      Social History     Tobacco Use   • Smoking status: Former     Current packs/day: 0.00     Average packs/day: 1.5 packs/day for 48.1 years (72.2 ttl pk-yrs)     Types: Cigarettes     Start date: 1970     Quit date: 2018     Years since quittin.6   • Smokeless tobacco: Never   Vaping Use   • Vaping status: Never Used   Substance and Sexual Activity   • Alcohol use: Yes     Alcohol/week: 10.0 standard drinks of alcohol     Types: 10 Cans of beer per week     Comment: occ   • Drug use: Never   • Sexual activity: Yes     Partners: Female        Medical History Reviewed by provider this encounter:  Problems     .    Objective{?Quick Links Trend Vitals * Enter New Vitals * Results Review * Timeline (Adult) * Labs * Imaging * Cardiology * Procedures * Lung Cancer Screening * Surgical eConsent :[id number]}  /70   Pulse 62   Ht 5' 11\" (1.803 m)   Wt 112 kg (246 lb 9.6 oz)   SpO2 98%   BMI 34.39 kg/m²      Body mass index is 34.39 " "kg/m².  Wt Readings from Last 3 Encounters:   04/28/25 112 kg (246 lb 9.6 oz)   04/23/25 110 kg (243 lb 9.6 oz)   04/02/25 110 kg (242 lb 6.4 oz)     Physical Exam  Vitals and nursing note reviewed.   Constitutional:       General: He is not in acute distress.     Appearance: Normal appearance. He is not ill-appearing, toxic-appearing or diaphoretic.   HENT:      Head: Normocephalic and atraumatic.      Nose: Nose normal.      Mouth/Throat:      Pharynx: Oropharynx is clear.   Eyes:      General: No scleral icterus.        Right eye: No discharge.         Left eye: No discharge.      Extraocular Movements: Extraocular movements intact.      Conjunctiva/sclera: Conjunctivae normal.   Neck:      Comments: Left thyroid nodule palpable with deep palpation.   Cardiovascular:      Rate and Rhythm: Normal rate and regular rhythm.      Pulses: Normal pulses.      Heart sounds: Normal heart sounds. No murmur heard.  Pulmonary:      Effort: Pulmonary effort is normal. No respiratory distress.   Abdominal:      General: There is no distension.   Musculoskeletal:         General: Normal range of motion.      Cervical back: Normal range of motion and neck supple.   Skin:     General: Skin is warm and dry.      Coloration: Skin is not jaundiced or pale.   Neurological:      Mental Status: He is alert and oriented to person, place, and time. Mental status is at baseline.   Psychiatric:         Mood and Affect: Mood normal.         Behavior: Behavior normal.         Thought Content: Thought content normal.         Judgment: Judgment normal.         Labs:   Lab Results   Component Value Date    HGBA1C 6.1 (H) 01/21/2025    HGBA1C 6.2 08/20/2024    HGBA1C 6.2 (H) 05/15/2024     No results found for: \"CREATININE\", \"BUN\", \"NA\", \"K\", \"CL\", \"CO2\"  No results found for: \"EGFR\"  No results found for: \"CHOL\", \"HDL\", \"LDL\", \"TRIG\", \"CHOLHDL\"  No results found for: \"ALT\", \"AST\", \"GGT\", \"ALKPHOS\", \"BILITOT\"  No results found for: " "\"[id number]\"  No results found for: \"FREET4\", \"TSI\"    Patient Instructions   An ultrasound guided fine needle aspiration has been ordered for you - schedule it at your earliest convenience. Call  to schedule this appointment.     Get bloodwork done at your earliest convenience - it does not need to be fasting. Please get it drawn before your fine needle aspiration.     We will reach out to discuss results.    Return for follow up in     Discussed with the patient and all questioned fully answered. He will call me if any problems arise.    {Administrative / Billing Section (Optional):20399}  "

## 2025-04-28 NOTE — PROGRESS NOTES
With a past medical historyName: Leopoldo Fishman      : 1955      MRN: [id number]  Encounter Provider: Diane Goodwin MD  Encounter Date: 2025   Encounter department: [facility] DIABETES [facility]OCRINOLOGY [place]    No chief complaint on file.  :  Assessment & Plan  Thyroid nodule  Patient with solid isoechoic left mid gland nodule measuring 4.5 X2.7X 2.9 cm with macrocalcifications (TI-RADS 4) noted on ultrasound on 3/17/2025.  No prior thyroid history, not currently taking levothyroxine.  No history of head and neck radiation.  No prior thyroid function testing available for review.  The nodule meets criteria for biopsy per the American Thyroid Association Management Guidelines for Adult Patients with Thyroid Nodules and Differentiated Thyroid Cancer.  Reviewed pathophysiology of thyroid nodules with patient  Will order baseline TSH as no prior labs on file.   Placed order for [facility] FNA  Discussed with patient possible results of fine needle aspiration.   Will reach out to discuss next steps once FNA results are back.   Next scheduled follow up in 6 months  Orders:    US guided thyroid biopsy with molecular testing; Future    TSH, 3rd generation with Free T4 reflex; Future        History of Present Illness     Leopoldo Fishman is a 69 y.o. male with a PMH of thyroid nodules, prediabetes, hyperlipidemia, obesity, LILY on CPAP who presents for new patient visit for the management of thyroid nodules.  Nodules were incidentally discovered on CT lung screening program in 2024 and patient was advised to obtain a thyroid ultrasound for additional evaluation.   Thyroid ultrasound performed on 3/17/2025 demonstrated a solid isoechoic left mid gland nodule measuring 4.5 X2.7X 2.9 cm with macrocalcifications.    He denies fatigue, cold intolerance, diarrhea or constipation. He notes losing 25lbs in the past 6 months intentionally, noting that he follows up with weight management and  has been additionally started on Zepbound. Denies skin, hair or nail changes.   No sweats, tremors of palpitations.   He denies issues with sleep. Has a history of LILY and wears CPAP regularly.   He takes a zinc supplement 25mg daily, as well as B12 and fish oil.   Denies family history of thyroid issues or thyroid cancer. No history of head or neck radiation.   Additionally he denies compressive symptoms, difficulty swallowing, foreign body sensation, choking, difficulty breathing.   Patient had no other complaints at this time.        Review of Systems   Constitutional:  Negative for appetite change and unexpected weight change.   HENT:  Negative for congestion, trouble swallowing and voice change.    Eyes:  Negative for visual disturbance.   Respiratory:  Negative for cough, choking, shortness of breath and stridor.    Cardiovascular:  Negative for chest pain, palpitations and leg swelling.   Gastrointestinal:  Negative for abdominal pain, constipation, diarrhea, nausea and vomiting.   Endocrine: Negative for cold intolerance, heat intolerance, polydipsia and polyuria.   Genitourinary:  Negative for frequency.   Musculoskeletal:  Negative for myalgias.   Skin:  Negative for rash.   Neurological:  Negative for dizziness, weakness, light-headedness, numbness and headaches.   Psychiatric/Behavioral:  Negative for sleep disturbance.    All other systems reviewed and are negative.   as per HPI        Medical History Reviewed by provider this encounter:  Problems     .  Past Medical History   Past Medical History:   Diagnosis Date    LILY (obstructive sleep apnea)     Plantar wart     Verruca 1972    planters warts     Past Surgical History:   Procedure Laterality Date    TOENAIL EXCISION  2011    grew back    TONSILLECTOMY  1959     History reviewed. No pertinent family history.   reports that he quit smoking about 6 years ago. His smoking use included cigarettes. He started smoking about 54 years ago. He has a 72.2  pack-year smoking history. He has never used smokeless tobacco. He reports current alcohol use of about 10.0 standard drinks of alcohol per week. He reports that he does not use drugs.  Current Outpatient Medications   Medication Instructions    atorvastatin (LIPITOR) 10 mg tablet No dose, route, or frequency recorded.    clobetasol (OLUX) 0.05 % topical foam As needed    clobetasol (TEMOVATE) 0.05 % GEL As needed    dexamethasone 0.5 mg/5 mL solution As needed    diclofenac sodium (VOLTAREN) 50 mg, 2 times daily PRN    doxycycline hyclate (VIBRAMYCIN) 50 mg capsule TAKE 1 CAPSULE BY MOUTH ONCE A DAY AS DIRECTED TAKE 1 CAPSULE BY MOUTH DAILY.    fluocinonide (LIDEX) 0.05 % gel 2 times daily    metFORMIN (GLUCOPHAGE-XR) 1,500 mg, Oral, Daily with breakfast    nystatin (MYCOSTATIN) 500,000 units/5 mL suspension As needed    Vtama 1 % CREA 1 Application, As needed    Zepbound 2.5 mg, Subcutaneous, Weekly   No Known Allergies   Current Outpatient Medications on File Prior to Visit   Medication Sig Dispense Refill    atorvastatin (LIPITOR) 10 mg tablet       clobetasol (OLUX) 0.05 % topical foam as needed      dexamethasone 0.5 mg/5 mL solution as needed      diclofenac sodium (VOLTAREN) 50 mg EC tablet Take 50 mg by mouth 2 (two) times a day as needed      metFORMIN (GLUCOPHAGE-XR) 750 mg 24 hr tablet TAKE 2 TABLETS (1,500 MG TOTAL) BY MOUTH DAILY WITH BREAKFAST DO NOT START BEFORE AUGUST 15, 2024. 180 tablet 0    nystatin (MYCOSTATIN) 500,000 units/5 mL suspension as needed      tirzepatide (Zepbound) 2.5 mg/0.5 mL auto-injector Inject 0.5 mL (2.5 mg total) under the skin once a week for 28 days 2 mL 0    Vtama 1 % CREA Apply 1 Application topically if needed      clobetasol (TEMOVATE) 0.05 % GEL Apply 1 application. topically as needed To affected area (Patient not taking: Reported on 5/23/2024)      doxycycline hyclate (VIBRAMYCIN) 50 mg capsule TAKE 1 CAPSULE BY MOUTH ONCE A DAY AS DIRECTED TAKE 1 CAPSULE BY MOUTH  "DAILY. (Patient not taking: Reported on 2024)      fluocinonide (LIDEX) 0.05 % gel 2 (two) times a day Apply sparingly to affected area (Patient not taking: Reported on 2024)       No current facility-administered medications on file prior to visit.      Social History     Tobacco Use    Smoking status: Former     Current packs/day: 0.00     Average packs/day: 1.5 packs/day for 48.1 years (72.2 ttl pk-yrs)     Types: Cigarettes     Start date: 1970     Quit date: 2018     Years since quittin.6    Smokeless tobacco: Never   Vaping Use    Vaping status: Never Used   Substance and Sexual Activity    Alcohol use: Yes     Alcohol/week: 10.0 standard drinks of alcohol     Types: 10 Cans of beer per week     Comment: occ    Drug use: Never    Sexual activity: Yes     Partners: Female        Medical History Reviewed by provider this encounter:  Problems     .    Objective   /70   Pulse 62   Ht 5' 11\" (1.803 m)   Wt 112 kg (246 lb 9.6 oz)   SpO2 98%   BMI 34.39 kg/m²      Body mass index is 34.39 kg/m².  Wt Readings from Last 3 Encounters:   25 112 kg (246 lb 9.6 oz)   25 110 kg (243 lb 9.6 oz)   25 110 kg (242 lb 6.4 oz)     Physical Exam  Vitals and nursing note reviewed.   Constitutional:       General: He is not in acute distress.     Appearance: Normal appearance. He is not ill-appearing, toxic-appearing or diaphoretic.   HENT:      Head: Normocephalic and atraumatic.      Nose: Nose normal.      Mouth/Throat:      Pharynx: Oropharynx is clear.   Eyes:      General: No scleral icterus.        Right eye: No discharge.         Left eye: No discharge.      Extraocular Movements: Extraocular movements intact.      Conjunctiva/sclera: Conjunctivae normal.   Neck:      Comments: Left thyroid nodule palpable with deep palpation.   Cardiovascular:      Rate and Rhythm: Normal rate and regular rhythm.      Pulses: Normal pulses.      Heart sounds: Normal heart sounds. No murmur " "heard.  Pulmonary:      Effort: Pulmonary effort is normal. No respiratory distress.   Abdominal:      General: There is no distension.   Musculoskeletal:         General: Normal range of motion.      Cervical back: Normal range of motion and neck supple.   Skin:     General: Skin is warm and dry.      Coloration: Skin is not jaundiced or pale.   Neurological:      Mental Status: He is alert and oriented to person, place, and time. Mental status is at baseline.   Psychiatric:         Mood and Affect: Mood normal.         Behavior: Behavior normal.         Thought Content: Thought content normal.         Judgment: Judgment normal.         Labs:   Lab Results   Component Value Date    HGBA1C 6.1 (H) 01/21/2025    HGBA1C 6.2 08/20/2024    HGBA1C 6.2 (H) 05/15/2024     No results found for: \"CREATININE\", \"BUN\", \"NA\", \"K\", \"CL\", \"CO2\"  No results found for: \"EGFR\"  No results found for: \"CHOL\", \"HDL\", \"LDL\", \"TRIG\", \"CHOLHDL\"  No results found for: \"ALT\", \"AST\", \"GGT\", \"ALKPHOS\", \"BILITOT\"  No results found for: \"[id number]\"  No results found for: \"FREET4\", \"TSI\"    Patient Instructions   An ultrasound guided fine needle aspiration has been ordered for you - schedule it at your earliest convenience. Call  to schedule this appointment.     Get bloodwork done at your earliest convenience - it does not need to be fasting. Please get it drawn before your fine needle aspiration.     We will reach out to discuss results.    Return for follow up in     Discussed with the patient and all questioned fully answered. He will call me if any problems arise.      "

## 2025-04-28 NOTE — PATIENT INSTRUCTIONS
An ultrasound guided fine needle aspiration has been ordered for you - schedule it at your earliest convenience. Call  to schedule this appointment.     Get bloodwork done at your earliest convenience - it does not need to be fasting. Please get it drawn before your fine needle aspiration.     We will reach out to discuss results.    Return for follow up in 6 months.

## 2025-04-29 ENCOUNTER — RESULTS FOLLOW-UP (OUTPATIENT)
Dept: ENDOCRINOLOGY | Facility: CLINIC | Age: 70
End: 2025-04-29

## 2025-04-29 LAB
DME PARACHUTE DELIVERY DATE REQUESTED: NORMAL
DME PARACHUTE ITEM DESCRIPTION: NORMAL
DME PARACHUTE ORDER STATUS: NORMAL
DME PARACHUTE SUPPLIER NAME: NORMAL
DME PARACHUTE SUPPLIER PHONE: NORMAL
TSH SERPL-ACNC: 1.41 MIU/L (ref 0.4–4.5)

## 2025-05-01 NOTE — NURSING NOTE
Call placed to patient to discuss upcoming appointment at [facility] radiology department and complete consultation with patient. Patient is having a thyroid biopsy utilizing US  guidance. Reviewed patient's allergies, no current anticoagulant medication present per patient, also discussed the pre and post procedure expectations. Patient made aware of need for  post procedure if anti anxiety medication is taken, per patient he will not be taking any medications to prevent him from driving. Reminded patient of location and time expected for procedure, Patient expressed understanding by verbalizing and repeating instructions.

## 2025-05-01 NOTE — TELEPHONE ENCOUNTER
Meg from [facility] called in received script for pressure of PAP machine which read 8.5  Meg stated pressure is in increments of 2 and would like to know if we can fax a new script with either 8.4 or 8.6    Fax: [phone]

## 2025-05-02 NOTE — TELEPHONE ENCOUNTER
Received call from Aneta from [employer]  States patient has an appointment for DME set up in an hour and they need a new Rx.  The pressure setting of 8.5 cannot be programmed.  It needs to be in even increments, so either 8.4 or 8.6.  New Rx can be faxed to [phone].    EPIC chat message sent to Dr. Garcia requesting to write new Rx.

## 2025-05-06 LAB

## 2025-05-11 DIAGNOSIS — R73.03 PREDIABETES: ICD-10-CM

## 2025-05-11 RX ORDER — METFORMIN HYDROCHLORIDE 750 MG/1
1500 TABLET, EXTENDED RELEASE ORAL
Qty: 60 TABLET | Refills: 0 | Status: SHIPPED | OUTPATIENT
Start: 2025-05-11

## 2025-05-14 ENCOUNTER — HOSPITAL ENCOUNTER (OUTPATIENT)
Dept: RADIOLOGY | Facility: HOSPITAL | Age: 70
Discharge: HOME | End: 2025-05-14
Payer: COMMERCIAL

## 2025-05-14 DIAGNOSIS — E04.1 THYROID NODULE: ICD-10-CM

## 2025-05-14 PROCEDURE — 88173 CYTOPATH EVAL FNA REPORT: CPT | Performed by: PATHOLOGY

## 2025-05-14 PROCEDURE — 10005 FNA BX W/US GDN 1ST LES: CPT

## 2025-05-14 RX ORDER — LIDOCAINE HYDROCHLORIDE 10 MG/ML
3 INJECTION, SOLUTION EPIDURAL; INFILTRATION; INTRACAUDAL; PERINEURAL ONCE
Status: COMPLETED | OUTPATIENT
Start: 2025-05-14 | End: 2025-05-14

## 2025-05-14 RX ADMIN — LIDOCAINE HYDROCHLORIDE 3 ML: 10 INJECTION, SOLUTION EPIDURAL; INFILTRATION; INTRACAUDAL; PERINEURAL at 11:17

## 2025-05-16 ENCOUNTER — RESULTS FOLLOW-UP (OUTPATIENT)
Dept: OTHER | Facility: HOSPITAL | Age: 70
End: 2025-05-16

## 2025-05-16 PROCEDURE — 88173 CYTOPATH EVAL FNA REPORT: CPT | Performed by: PATHOLOGY

## 2025-05-16 NOTE — TELEPHONE ENCOUNTER
Reached out to Mr. Fishman to discuss FNA results. For now plan is to await results of molecular testing, and we'll discuss next steps after results are back. All questions were answered.

## 2025-06-05 ENCOUNTER — TELEPHONE (OUTPATIENT)
Dept: ENDOCRINOLOGY | Facility: CLINIC | Age: 70
End: 2025-06-05

## 2025-06-05 NOTE — TELEPHONE ENCOUNTER
Attempted to reach out the patient to discuss affirm results and next steps.  Left voicemail that I will try calling again tomorrow.

## 2025-06-06 DIAGNOSIS — E04.1 THYROID NODULE: Primary | ICD-10-CM

## 2025-06-06 NOTE — TELEPHONE ENCOUNTER
Was able to reach out to Mr. Fishman to discuss results of Afirma testing which was benign. Explained that based off the sample provided, the risk of malignancy is 4%. Also discussed that given size, we could consider closely monitoring with serial ultrasounds vs. Surgical removal. At this time patient would like to proceed with referral to surgical oncology do discuss possible surgical intervention. Referral provided for surgical oncology, advised patient that am available if he has any additional questions either by calling the office or mychart. Mr. Fishamn verbalized understanding and is in agreement with the plan.

## 2025-06-08 DIAGNOSIS — R73.03 PREDIABETES: ICD-10-CM

## 2025-06-09 RX ORDER — METFORMIN HYDROCHLORIDE 750 MG/1
1500 TABLET, EXTENDED RELEASE ORAL
Qty: 60 TABLET | Refills: 0 | Status: SHIPPED | OUTPATIENT
Start: 2025-06-09

## 2025-06-12 ENCOUNTER — TELEPHONE (OUTPATIENT)
Dept: SURGICAL ONCOLOGY | Facility: CLINIC | Age: 70
End: 2025-06-12

## 2025-06-12 NOTE — TELEPHONE ENCOUNTER
"Called patient to reschedule his consult appt with Dr Howe. He agreed to move appt out as thyroid nodule \" has been there for quite some time and is not bothering me\". Recent biopsy was benign.   "

## 2025-06-19 ENCOUNTER — OFFICE VISIT (OUTPATIENT)
Age: 70
End: 2025-06-19
Payer: COMMERCIAL

## 2025-06-19 VITALS
BODY MASS INDEX: 33.68 KG/M2 | SYSTOLIC BLOOD PRESSURE: 116 MMHG | HEIGHT: 71 IN | DIASTOLIC BLOOD PRESSURE: 76 MMHG | WEIGHT: 240.6 LBS | TEMPERATURE: 97.1 F | HEART RATE: 71 BPM

## 2025-06-19 DIAGNOSIS — E66.811 OBESITY, CLASS I, BMI 30-34.9: ICD-10-CM

## 2025-06-19 DIAGNOSIS — R73.03 PREDIABETES: Primary | ICD-10-CM

## 2025-06-19 DIAGNOSIS — G47.30 SLEEP APNEA WITH USE OF CONTINUOUS POSITIVE AIRWAY PRESSURE (CPAP): ICD-10-CM

## 2025-06-19 PROCEDURE — 99213 OFFICE O/P EST LOW 20 MIN: CPT | Performed by: PHYSICIAN ASSISTANT

## 2025-06-19 RX ORDER — GABAPENTIN 100 MG/1
CAPSULE ORAL
COMMUNITY
Start: 2025-04-01

## 2025-06-19 RX ORDER — TIRZEPATIDE 5 MG/.5ML
5 INJECTION, SOLUTION SUBCUTANEOUS WEEKLY
Qty: 2 ML | Refills: 3 | Status: SHIPPED | OUTPATIENT
Start: 2025-06-19 | End: 2025-10-09

## 2025-06-19 NOTE — PROGRESS NOTES
Assessment/Plan:  1. Prediabetes  Hemoglobin A1C      2. Obesity, Class I, BMI 30-34.9  tirzepatide (Zepbound) 5 mg/0.5 mL auto-injector      3. Sleep apnea with use of continuous positive airway pressure (CPAP)  tirzepatide (Zepbound) 5 mg/0.5 mL auto-injector              Initial: 265  lbs (8/2023)  Previous: 247 lbs BMI 34. 51 (3/6/25)  Current weight: 240 lbs BMI 33.56 (6/19/25)  Change:  -25 lbs  Goal: 220 lbs    - Weight not at goal  - Patient is interested in Conservative Program  - Labs reviewed: As below.    General Recommendations:  Nutrition:  Eat breakfast daily.  Do not skip meals.     Food log (ie.) www.[employer].com, sparkpeople.com, loseit.com, calorieking.com, etc.    Practice mindful eating.  Be sure to set aside time to eat, eat slowly, and savor your food.    Hydration:    At least 64oz of water daily.  No sugar sweetened beverages.  No juice (eat the fruit instead).    Exercise:  Studies have shown that the ideal exercise goal is somewhere between 150 to 300 minutes of moderate intensity exercise a week.  Start with exercising 10 minutes every other day and gradually increase physical activity with a goal of at least 150 minutes of moderate intensity exercise a week, divided over at least 3 days a week.  An example of this would be exercising 30 minutes a day, 5 days a week.  Resistance training can increase muscle mass and increase our resting metabolic rate.   FULL BODY resistance training is recommended 2-3 times a week.  Do not do this on consecutive days to allow for muscle recovery.    Aim for a bare minimum 5000 steps, even on days you do not exercise.    Monitoring:   Weigh yourself daily.  If this causes undue stress, then just weigh yourself once a week.  Weigh yourself the same time of the day with the same amount of clothing on.  Preferably this should be done after waking up, before you eat, and with no clothing or minimal clothing on.    Nutrition  Prescription  Calories:[id number]  Protein:105-125 gm    Return visit:    Medical record request from patient's sleep medicine referral  Calorie tracking/deficit  Physical activity goals - keep up the good work!      AOM   Previously took topamax without improvement  Currently managed on metformin 1500mg QD. Hemoglobin A1c remains stable at 6.1  Patient is a good candidate for GLP1 RA for treatment of his severe LILY  Continue zepbound 5mg  - Patient denies personal history of pancreatitis. Patient also denies personal and family history of thyroid cancer and multiple endocrine neoplasia type 2 (MEN 2 tumor).   RTC in 4 months  ______________________________________________________________________    Subjective:   Chief Complaint   Patient presents with    Follow-up     4 MO [facility] F/U--Waist: 47in     Patient here to discuss weight associated problems and nutrition goals  HPI: Leopoldo Fishman  is a 70 y.o. male with history of diabetes, LILY, and excess weight.  Weight loss plan:  Conservative Program.   Most recent notes and records were reviewed.    Patient was previously on Wegovy which was titrated up to 1.7 mg weekly then switched to Ozempic due to change in insurance formulary.  He reported constipation and GERD as side effects.  Also not effective for weight loss.  He was started on metformin on 1/31/2023 which was titrated up to 1500 mg daily.  He was tried on topiramate which was titrated up to 50 mg twice a day without any improvement in appetite, craving or weight reduction.  This medication was since stopped.  Hg A1C now rising from 5.9 % on 12/27/23 to now 6.2% on 5/15/2024.  Increase protein and reduced carbs.       Dietary - increased his protein, decreased starch. Portions  things out with 100 ilya packs. Increased water.  Exercise - walking daily, averages 10,000 steps/day. Cutting grass push mower. Going to [employer] 2-3x/week.  [place]le ball in [place].     No past history of MI or stroke    Patient reports  "a history of severe sleep apnea for many years  Following with sleep medicine  Continues to use CPAP for the last 15 years.   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 Pending surgical oncology consult.     Zepbound started 4/19/25  Current dose: 5mg     Noticing some appetite suppression. Did have 1 week of diarrhea.   Eating similar as before, smaller portions       Wt Readings from Last 10 Encounters:   06/19/25 109 kg (240 lb 9.6 oz)   04/28/25 112 kg (246 lb 9.6 oz)   04/23/25 110 kg (243 lb 9.6 oz)   04/02/25 110 kg (242 lb 6.4 oz)   03/06/25 112 kg (247 lb 6.4 oz)   03/05/25 110 kg (242 lb 14.4 oz)   11/06/24 110 kg (242 lb 3.2 oz)   08/27/24 112 kg (246 lb 3.2 oz)   07/17/24 113 kg (249 lb 9.6 oz)   05/23/24 117 kg (258 lb 9.6 oz)     Initial: 265  lbs  Previous: 263  lbs  Current weight: 258 lbs  Change: -5 lbs  Goal: 220 lbs    Gained up to 270.  Met with dietician.  Lost 12 lbs in a month.    Review Of Systems:  Review of Systems   Constitutional:  Negative for activity change, appetite change, chills, fatigue and fever.   HENT:  Negative for trouble swallowing.    Respiratory:  Negative for cough and shortness of breath.    Cardiovascular:  Negative for chest pain, palpitations and leg swelling.   Gastrointestinal:  Negative for abdominal pain, constipation, diarrhea, nausea and vomiting.   Endocrine: Negative for cold intolerance and heat intolerance.   Genitourinary:  Negative for difficulty urinating and dysuria.   Musculoskeletal:  Negative for arthralgias, back pain, gait problem and myalgias.   Skin:  Negative for pallor and rash.   Neurological:  Negative for headaches.   Psychiatric/Behavioral:  Negative for dysphoric mood and suicidal ideas. The patient is not nervous/anxious.      Objective:  /76   Pulse 71   Temp (!) 97.1 °F (36.2 °C) (Tympanic)   Ht 5' 11\" (1.803 m)   Wt 109 kg (240 lb 9.6 oz)   BMI 33.56 kg/m²   Physical " "Exam  Vitals and nursing note reviewed.   Constitutional:       General: He is not in acute distress.     Appearance: Normal appearance. He is not ill-appearing or diaphoretic.     Eyes:      General: No scleral icterus.      Cardiovascular:      Rate and Rhythm: Normal rate and regular rhythm.      Pulses: Normal pulses.      Heart sounds: Normal heart sounds. No murmur heard.  Pulmonary:      Effort: Pulmonary effort is normal. No respiratory distress.      Breath sounds: Normal breath sounds. No stridor. No wheezing or rhonchi.     Musculoskeletal:      Cervical back: Neck supple.      Right lower leg: No edema.      Left lower leg: No edema.   Lymphadenopathy:      Cervical: No cervical adenopathy.     Skin:     Capillary Refill: Capillary refill takes less than 2 seconds.      Findings: No lesion or rash.     Neurological:      Mental Status: He is alert and oriented to person, place, and time.      Gait: Gait normal.     Psychiatric:         Mood and Affect: Mood normal.         Behavior: Behavior normal.       Labs and Imaging  Recent labs and imaging have been personally reviewed.  No results found for: \"WBC\", \"HGB\", \"HCT\", \"MCV\", \"PLT\"  No results found for: \"NA\", \"SODIUM\", \"K\", \"CL\", \"CO2\", \"ANIONGAP\", \"AGAP\", \"BUN\", \"CREATININE\", \"GLUC\", \"GLUF\", \"CALCIUM\", \"AST\", \"ALT\", \"ALKPHOS\", \"PROT\", \"TP\", \"BILITOT\", \"TBILI\", \"EGFR\"  Lab Results   Component Value Date    HGBA1C 6.1 (H) 01/21/2025       "

## 2025-07-14 DIAGNOSIS — R73.03 PREDIABETES: ICD-10-CM

## 2025-07-14 RX ORDER — METFORMIN HYDROCHLORIDE 750 MG/1
1500 TABLET, EXTENDED RELEASE ORAL
Qty: 60 TABLET | Refills: 0 | Status: SHIPPED | OUTPATIENT
Start: 2025-07-14

## 2025-07-17 PROBLEM — E04.1 THYROID NODULE: Status: ACTIVE | Noted: 2025-07-17

## 2025-07-21 ENCOUNTER — CONSULT (OUTPATIENT)
Dept: SURGICAL ONCOLOGY | Facility: CLINIC | Age: 70
End: 2025-07-21
Payer: COMMERCIAL

## 2025-07-21 VITALS
HEART RATE: 67 BPM | SYSTOLIC BLOOD PRESSURE: 130 MMHG | DIASTOLIC BLOOD PRESSURE: 80 MMHG | BODY MASS INDEX: 34.02 KG/M2 | TEMPERATURE: 96.9 F | WEIGHT: 243 LBS | HEIGHT: 71 IN | OXYGEN SATURATION: 97 %

## 2025-07-21 DIAGNOSIS — E04.1 THYROID NODULE: Primary | ICD-10-CM

## 2025-07-21 PROCEDURE — 99203 OFFICE O/P NEW LOW 30 MIN: CPT | Performed by: SURGERY

## 2025-07-21 NOTE — PROGRESS NOTES
"Name: Leopoldo Fishman      : 1955      MRN: [id number]  Encounter Provider: Jagjit Howe MD  Encounter Date: 2025   Encounter department: [facility] [place]  :  Assessment & Plan  Thyroid nodule    Orders:    Ambulatory Referral to Surgical Oncology    US thyroid; Future    Treatment options including surveillance versus hemithyroidectomy were discussed with him.  Given benign biopsy results, lack of family history, lack of radiation exposure history, observation would be reasonable.  Will therefore plan on 6-month follow-up ultrasound.  Copies of reports given to patient for his records.  Questions answered.    History of Present Illness   Chief Complaint   Patient presents with    Consult     Return in about 6 months (around 2026) for With [facility].    Patient is a 70-year-old man who is found on routine physical exam to have an enlarged left thyroid nodule.  He was not aware of the fullness in the left thyroid to this point.  Ultrasound was done confirming a 4.5 x 2.9 cm nodule.  Biopsy was done which was effectively benign on Afirma.  He is here to discuss treatment options.  He has no history radiation exposure to the head or neck area.  No personal or family history of endocrine malignancies.    Review of Systems   Constitutional: Negative.    HENT: Negative.     Eyes: Negative.    Respiratory: Negative.     Cardiovascular: Negative.    Gastrointestinal: Negative.    Endocrine: Negative.    Genitourinary: Negative.    Musculoskeletal: Negative.    Skin: Negative.    Allergic/Immunologic: Negative.    Neurological: Negative.    Hematological: Negative.    Psychiatric/Behavioral: Negative.     All other systems reviewed and are negative.   A complete review of systems is negative other than that noted above in the HPI.             Objective   /80   Pulse 67   Temp (!) 96.9 °F (36.1 °C)   Ht 5' 11\" (1.803 m)   Wt 110 kg (243 lb)   " SpO2 97%   BMI 33.89 kg/m²     Pain Screening:  Pain Score: 0-No pain  ECOG    Physical Exam  Vitals reviewed.   Constitutional:       Appearance: Normal appearance.   HENT:      Head: Normocephalic and atraumatic.     Eyes:      Extraocular Movements: Extraocular movements intact.      Pupils: Pupils are equal, round, and reactive to light.     Neck:      Comments: Left thyroid lobe fullness    Cardiovascular:      Rate and Rhythm: Normal rate and regular rhythm.      Heart sounds: Normal heart sounds.   Pulmonary:      Breath sounds: Normal breath sounds.   Abdominal:      General: Abdomen is flat.      Palpations: Abdomen is soft.     Musculoskeletal:         General: Normal range of motion.      Cervical back: Normal range of motion and neck supple. No rigidity.   Lymphadenopathy:      Cervical: No cervical adenopathy.     Skin:     General: Skin is warm and dry.     Neurological:      General: No focal deficit present.      Mental Status: He is alert and oriented to person, place, and time.     Psychiatric:         Mood and Affect: Mood normal.         Behavior: Behavior normal.        Constitutional: General appearance: The Patient is well-developed and well-nourished    Labs: I have reviewed pertinent labs.       Component  Ref Range & Units (hover)    Case Report   Non-gynecologic Cytology                          Case: [id number]                                   Authorizing Provider:  Jazmin Larson MD       Collected:           05/14/2025 1122               Ordering Location:     [facility]      Received:            05/14/2025 1203                                      [facility] 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     Satisfactory for evaluation.        NOTE: As reported in the [place] System for Reporting Thyroid Cytopathology* this diagnostic category has demonstrated anywhere from 20-50% risk of malignancy being found in subsequent resections and/or FNA.  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  The histologic follow-up of cases diagnosed as follicular neoplasm includes follicular adenoma, follicular carcinoma, and follicular variant of papillary thyroid carcinoma including the recently described indolent counterpart, NIFTP.  Consideration of clinical and sonographic features as well as molecular testing results may be used to supplement malignancy risk assessment in lieu of proceeding directly to surgery.     *The [place] System for Reporting Thyroid Cytopathology, Caesar Matthews., Jesus Alberto Myers. (Eds.), 2023 (3rd ed.)   Electronically signed by Benny Armas MD on 5/16/2025 at 1038 EDT   Note    The treating physician has requested a sample(s) from the above thyroid nodule(s) be sent for analysis by  [facility] Genomic Sequencing  (Afirma GSC), performed by [facility] [employer] ([place], [place]).  [facility] only performs this test on specimen(s) receiving a cytologic diagnosis of [place] Category III or  IV. If such a diagnosis is rendered (above) specimen will be sent to [facility], and a separate report with  results of Afirma GSC will follow directly from [facility] (typically taking 14 days). If a cytologic  interpretation other than De[place]field category III or IV is rendered, specimen will  not be sent for [facility].      Intraoperative Consultation    Thyroid, Left Mid Pole FNA On-Site Evaluation:      Adequate. 3 passes reviewed.      MICKEY Kwong (ASCP) on 5/14/2025 at 11:15 AM  Interpretation performed at [facility], [address] [place] [id number]        AFUdall BENIGN    Narrative & Impression   THYROID ULTRASOUND     INDICATION: E04.1: Nontoxic single thyroid nodule.     COMPARISON: Compared with CT of 9/4/2024     TECHNIQUE: Ultrasound of the thyroid was performed with a high frequency linear transducer in transverse and sagittal planes including volumetric imaging sweeps as well as traditional still imaging technique.     FINDINGS:  Thyroid texture: Normal homogeneous smooth echotexture.     Right lobe: 5.6 x 1.8 x 1.5 cm. Volume 7.5 mL  Left lobe: 6.2 x 2.9 x 3.1 cm. Volume 26.3 mL  Isthmus: 0.6 cm.     Nodule #1. Image 83.  LEFT midgland nodule measuring 4.5 x 2.7 x 2.9 cm.  COMPOSITION: 2 points, solid or almost completely solid.  ECHOGENICITY: 1 point, hyperechoic or isoechoic.  SHAPE: 0 points, wider-than-tall.  MARGIN: 0 points, ill-defined.  ECHOGENIC FOCI: 1 point, macrocalcifications.  TI-RADS Classification: TR 4 (4-6 points). FNA if >/= 1.5 cm. Follow if >/= 1 cm.              IMPRESSION:  The following meet current ACR criteria for recommending ultrasound guided biopsy:     The 4.5 x 2.7 x 2.9 cm left mid gland nodule. (Image number 83) (CRITERIA: TR 4, Moderately suspicious. FNA if > 1.5 cm.                 Reference: ACR Thyroid Imaging, Reporting and Data System (TI-RADS): White Paper of the ACR TI-RADS Committee. J AM Go Radiol 2017;14:587-595. Additional recommendations based on American Thyroid Association 2015 guidelines.        Workstation performed: [id number]

## 2025-07-23 ENCOUNTER — OFFICE VISIT (OUTPATIENT)
Dept: SLEEP CENTER | Facility: CLINIC | Age: 70
End: 2025-07-23
Payer: COMMERCIAL

## 2025-07-23 VITALS
DIASTOLIC BLOOD PRESSURE: 70 MMHG | SYSTOLIC BLOOD PRESSURE: 118 MMHG | OXYGEN SATURATION: 97 % | WEIGHT: 243.6 LBS | HEART RATE: 71 BPM | BODY MASS INDEX: 33 KG/M2 | HEIGHT: 72 IN

## 2025-07-23 DIAGNOSIS — G47.33 OSA (OBSTRUCTIVE SLEEP APNEA): Primary | ICD-10-CM

## 2025-07-23 DIAGNOSIS — E66.811 CLASS 1 OBESITY WITH SERIOUS COMORBIDITY AND BODY MASS INDEX (BMI) OF 33.0 TO 33.9 IN ADULT, UNSPECIFIED OBESITY TYPE: ICD-10-CM

## 2025-07-23 PROCEDURE — 99214 OFFICE O/P EST MOD 30 MIN: CPT | Performed by: STUDENT IN AN ORGANIZED HEALTH CARE EDUCATION/TRAINING PROGRAM

## 2025-07-23 PROCEDURE — G2211 COMPLEX E/M VISIT ADD ON: HCPCS | Performed by: STUDENT IN AN ORGANIZED HEALTH CARE EDUCATION/TRAINING PROGRAM

## 2025-07-23 NOTE — ASSESSMENT & PLAN NOTE
Lalo is a very pleasant 70-year-old gentleman with a PMHx of HLD, obesity, prediabetes who presents in follow up for LILY (AHI 42.2, O2 elio 84% 6/2013).  He is overall doing fairly well with the device, endorsing significant benefit, however is having significant difficulties with a combination of rainout and xerostomia while trying to sort out the humidity on this device, which he is not as pleased with as with his previous device.    Compliance report reviewed and analyzed  Continue AutoPAP at settings of 8-20 cmH2O  Dry Mouth Recommendations provided and reviewed   Prescription for new supplies ordered today  Reviewed CMS/insurance requirements and resupply guidelines  Information provided on the above as well as general maintenance steps  Recommended maintaining good Sleep Hygiene    Orders:    PAP DME Resupply/Reorder

## 2025-07-23 NOTE — PROGRESS NOTES
Name: Leopoldo Fishman      : 1955      MRN: [id number]  Encounter Provider: Eros Garcia DO  Encounter Date: 2025   Encounter department: [facility] [place]  :  Assessment & Plan  LILY (obstructive sleep apnea)  Lalo is a very pleasant 70-year-old gentleman with a PMHx of HLD, obesity, prediabetes who presents in follow up for LILY (AHI 42.2, O2 elio 84% 2013).  He is overall doing fairly well with the device, endorsing significant benefit, however is having significant difficulties with a combination of rainout and xerostomia while trying to sort out the humidity on this device, which he is not as pleased with as with his previous device.    Compliance report reviewed and analyzed  Continue AutoPAP at settings of 8-20 cmH2O  Dry Mouth Recommendations provided and reviewed   Prescription for new supplies ordered today  Reviewed CMS/insurance requirements and resupply guidelines  Information provided on the above as well as general maintenance steps  Recommended maintaining good Sleep Hygiene    Orders:    PAP DME Resupply/Reorder    Class 1 obesity with serious comorbidity and body mass index (BMI) of 33.0 to 33.9 in adult, unspecified obesity type  Still on Zepbound through [facility]. Has lost ~35 pounds.    Discussed the importance of maintaining a healthy weight on SDB control/management, and vice versa.  Encouraged lifestyle practices to maintain a healthy weight (including diet, exercise).  Continue to follow with Weight Management per their recommendations    Orders:    PAP DME Resupply/Reorder          Follow-up:  He will Return in about 1 year (around 2026).      ________________________________________________________________________________________________    Per Last Visit Note (Date: 2025):  LILY (obstructive sleep apnea)  Leopoldo is a very pleasant 69-year-old gentleman with PMHx of HLD, obesity, prediabetes who presents for LILY (AHI 42.2, O2 elio 84% 2013).  He  is overall doing very well with PAP therapy after his initial diagnosis, however he is certainly eligible for a new device.     Compliance report reviewed and analyzed  Continue AutoPAP at settings of 8.5-20 cmH2O  New device ordered at the above settings given that [facility] has shut down their sleep department,  Prescription for new supplies ordered today  Reviewed CMS/insurance requirements and resupply guidelines  Information provided on the above as well as general maintenance steps  Recommended maintaining good Sleep Hygiene     Orders:    Cpap DME     Class 1 obesity with serious comorbidity and body mass index (BMI) of 33.0 to 33.9 in adult, unspecified obesity type     Discussed the importance of maintaining a healthy weight on SDB control/management, and vice versa.  Encouraged lifestyle practices to maintain a healthy weight (including diet, exercise).  Reviewed the medication Zepbound, including indications and potential side effects of use   Continue to follow with Weight Management per their recommendations     Orders:    Cpap DME      Sleep Studies:  -Split Night 6/7/2013: BMI: --. DIAGNOSTIC: TST: 123.5 minutes, Sleep efficiency: 77%. Sleep Onset Latency: 23 minutes, REM Onset Latency: 94 minutes. AHI: 42.2, Supine AHI: --, REM AHI: --. O2 elio: 84%, Time below 90%: -- minutes. PLM Index: 75.8, PLM Arousal Index: --. THERAPEUTIC: Titration started at -- cmH2O, titrated to 9 cmH2O. Best pressure noted to be 9 cmH2O.       ________________________________________________________________________________________________      Interval History: Leopoldo Fishman is a 70 y.o. male with a PMHx of HLD, obesity, prediabetes who presents in follow up for LILY (AHI 42.2, O2 elio 84% 6/2013).    Still on Zepbound through [facility]. Has lost ~35 pounds.    SDB:  -Current experience with PAP Therapy: Definitely beneficial. Does have both xerostomia and rainout, sometimes on the same night. Is currently avoiding the  "heated tube, \"just dealing with\" the xerostomia.   -Uses the Biotene gel, at times in the middle of the night  -Mask type: Nasal   -Difficulties with mask: Denies  -Device: ResMed AirSense 11; received ~5/2025.  -Difficulties with device: See above  -DME: [facility]  -Compliance:                SLEEP SCHEDULE:  Bedtime: 0000   Time it takes to fall sleep: <15 minutes  Wake up Time: 0830   Number of times patient wakes up per night: 2  Reason (s) why patient wakes up during the night: Restroom   Naps: 1, 45-60 minutes  Estimated total sleep time ( in a 24 hour period of time): ~8-9 hours       Changes to PMH, PSH, SH: Denies         Sitting and reading: Would never doze  Watching TV: Would never doze  Sitting, inactive in a public place (e.g. a theatre or a meeting): Would never doze  As a passenger in a car for an hour without a break: Would never doze  Lying down to rest in the afternoon when circumstances permit: High chance of dozing  Sitting and talking to someone: Would never doze  Sitting quietly after a lunch without alcohol: Would never doze  In a car, while stopped for a few minutes in traffic: Would never doze  Total score: 3     Review of Systems  Pertinent positives/negatives included in HPI and also as noted:     Medications Ordered Prior to Encounter[1]   Objective   /70 (BP Location: Left arm, Patient Position: Sitting, Cuff Size: Standard)   Pulse 71   Ht 6' (1.829 m)   Wt 110 kg (243 lb 9.6 oz)   SpO2 97%   BMI 33.04 kg/m²        Physical Exam  Visit Vitals  /70 (BP Location: Left arm, Patient Position: Sitting, Cuff Size: Standard)   Pulse 71   Ht 6' (1.829 m)   Wt 110 kg (243 lb 9.6 oz)   SpO2 97%   BMI 33.04 kg/m²   Smoking Status Former   BSA 2.31 m²       PHYSICAL EXAMINATION:  Vital Signs: /70 (BP Location: Left arm, Patient Position: Sitting, Cuff Size: Standard)   Pulse 71   Ht 6' (1.829 m)   Wt 110 kg (243 lb 9.6 oz)   SpO2 97%   BMI 33.04 kg/m² " "    Constitutional: NAD, well appearing   Mental Status: AAOx3  Skin: Warm, dry, no rashes noted   Eyes: PERRL, normal conjunctiva  Chest: No evidence of respiratory distress, no accessory muscle use; no evidence of peripheral cyanosis  Abdomen: Soft, NT/ND  Extremities: No digital clubbing or pedal edema  Neuro: Strength 5/5 throughout, sensation grossly intact      Data  No results found for: \"HGB\", \"HCT\", \"MCV\"   No results found for: \"GLUCOSE\", \"CALCIUM\", \"NA\", \"K\", \"CO2\", \"CL\", \"BUN\", \"CREATININE\"  No results found for: \"IRON\", \"TIBC\", \"FERRITIN\"  No results found for: \"AST\", \"ALT\"        Electronically signed by:    Eros Garcia DO  Board-Certified Neurology and Sleep Medicine  [facility]  07/23/25       [1]   Current Outpatient Medications on File Prior to Visit   Medication Sig Dispense Refill    atorvastatin (LIPITOR) 10 mg tablet       clobetasol (OLUX) 0.05 % topical foam as needed      gabapentin (NEURONTIN) 100 mg capsule       metFORMIN (GLUCOPHAGE-XR) 750 mg 24 hr tablet TAKE 2 TABLETS (1,500 MG TOTAL) BY MOUTH DAILY WITH BREAKFAST DO NOT START BEFORE AUGUST 15, 2024. 60 tablet 0    nystatin (MYCOSTATIN) 500,000 units/5 mL suspension as needed      tirzepatide (Zepbound) 5 mg/0.5 mL auto-injector Inject 0.5 mL (5 mg total) under the skin once a week 2 mL 3    Vtama 1 % CREA Apply 1 Application topically if needed      clobetasol (TEMOVATE) 0.05 % GEL Apply 1 application. topically as needed To affected area (Patient not taking: Reported on 7/23/2025)      dexamethasone 0.5 mg/5 mL solution as needed (Patient not taking: Reported on 7/21/2025)      diclofenac sodium (VOLTAREN) 50 mg EC tablet Take 50 mg by mouth as needed in the morning and 50 mg as needed in the evening. (Patient not taking: Reported on 7/21/2025)      doxycycline hyclate (VIBRAMYCIN) 50 mg capsule TAKE 1 CAPSULE BY MOUTH ONCE A DAY AS DIRECTED TAKE 1 CAPSULE BY MOUTH DAILY. (Patient not taking: Reported " on 5/23/2024)      fluocinonide (LIDEX) 0.05 % gel 2 (two) times a day Apply sparingly to affected area (Patient not taking: Reported on 1/8/2024)       No current facility-administered medications on file prior to visit.

## 2025-07-23 NOTE — PATIENT INSTRUCTIONS
Plan:  Continue AutoPAP at settings of 8-20 cmH2O  Remember to clean your mask and equipment regularly, as directed.  You should be eligible for new supplies approximately every 3-6 months, depending on your insurance coverage. Contact your Durable Medical Equipment (DME) company for new supplies as needed.  See Dry Mouth Recommendations below  Practice good Sleep Hygiene, as outlined below.  Follow up in 12 months.      General PAP Information:  Care and Maintenance  Headgear should be washed as needed. Daily inspection and weekly washings are recommended. Do not disassemble the straps. Machine wash in warm water, making sure to attach Velcro hooks and tabs before washing. Line dry or machine dry on a low setting.  Masks should be washed every day. Daily inspection is recommended. Leave the mask and tubing attached. Gently wash the mask with a soft cloth using warm water and mild detergent, concentrating on the mask cushion flaps. DO NOT use alcohol or bleach. Rinse thoroughly and air dry.  Tubing and headgear should be washed weekly. Daily inspection is recommended. Wash in warm water and mild detergent and rinse thoroughly. Hook the tubing to the machine and blow until dry.  Humidifier should be washed daily and filled with DISTILLED water before use. Wash with warm water and mild detergent. Rinse thoroughly and air dry.  Disposable filters should be replaced once a month. Wash reusable foam filters with warm water and mild detergent at least once a month. Rinse thoroughly and dry with paper towels.  Avoid  that contain fragrance or conditioners, as these will leave a residue.  NEVER iron any soft goods.      CMS Requirements    Your insurance requires a face-to-face follow up visit within a 31-90 day period after starting CPAP.  Your insurance requires compliance with CPAP, which is at least 4 hours per night for 70% of the time. This must be done over a 30 day period and must occur within the initial  31-90 day period after starting CPAP.  Your insurance also requires at least yearly follow ups to continue to pay for CPAP supplies.       PAP Supply Guidelines    Below are the guidelines for reordering your supplies. You will be responsible for your deductible, co payments, and out of pocket expenses.    Item Frequency   Nasal Mask (no headgear) 1 every 3 months   Nasal Mask Cushion 1 every 2 weeks   Full Face Mask (no headgear) 1 every 3 months   Full Face Mask Cushion 1 every month   Nasal Pillows Cushion 1 every 2 weeks   Headgear 1 every 6 months   hin Strap 1 every 6 months   kena 1 every 3 months   Filters: Reusable 1 every 6 months   Filters: Disposable 1 every 2 weeks   Humidifier Chamber(disposable) 1 every 6 months           Good Sleep Hygiene  Wake up at the same time every day, even on the weekends.  Use your bed for sleep and intimacy only.  If you have been in bed awake for 30 minutes, get up and leave the bedroom. Choose a dull activity not involving a blue screen (TV, computer, handheld devices). Go back to bed when you feel sleepy.  Avoid caffeine, nicotine and alcohol before you go to bed.  Avoid large meals before you go to bed.  Avoid using screens (computers, tablets, smartphones, etc.) for at least 1 hour before bedtime  Exercise regularly, but do not exercise right before you go to bed.  Avoid daytime naps. If you do take a nap, sleep for 20-40 minutes, and not after 2pm.       How to Stop CPAP Dry Mouth  CPAP users can take a number of steps to alleviate dry mouth. The best solution depends on the main cause of the problem.  -- Keep the CPAP Airflow Moist: One step that may reduce dry mouth is moistening the airflow through the CPAP by using a humidifier. Humidification can also help if the nose gets dried out. Ensure your humidification is at the max level and not on auto.  -- Keep the Mouth Closed: A frequent cause of dry mouth for CPAP users is mouth breathing, in which the mouth is open  during sleep. Keeping the mouth closed with a chin strap or adhesive strips can reduce mouth breathing and relieve dry mouth.  --Chin strap: Chin straps encircle the head and gently cradle the chin to keep it closed. Research shows that chin straps increase people's willingness to continue using CPAP  -- Adhesive strips: CPAP users may also use disposable adhesive strips applied over the mouth to encourage sleeping with the mouth closed when using CPAP  -- Find the Best CPAP Mask: A CPAP mask that fits poorly or seals improperly can cause air leaks and dry mouth. You can contact your medical equipment company to discuss this  -- Consider using a product to improve dry mouth:   -A Biotene product used before you put your CPAP on can help improve dry mouth. You can find products at the following link. While all products work well, the gel tends to last a little longer than others  -- https://www."[employer]/dry-mouth-products/moisturizing-gel/   -Some patients have had luck with a product called Xylimelts. These are disks that adhere strongly to the teeth and gums and slowly melt during the night, releasing a natural sweetener called xylitol to stimulate saliva production  -- Consider a product to augment the CPAP's existing water chamber:   -These are adjunctive or alternative/larger water chambers/systems to augment or improve upon the CPAP water chamber. They are often not cheap, but can be worth the investment.   -One example is the [employer]:    -- https://[employer]/

## 2025-07-23 NOTE — ASSESSMENT & PLAN NOTE
Still on Zepbound through [facility]. Has lost ~35 pounds.    Discussed the importance of maintaining a healthy weight on SDB control/management, and vice versa.  Encouraged lifestyle practices to maintain a healthy weight (including diet, exercise).  Continue to follow with Weight Management per their recommendations    Orders:    PAP DME Resupply/Reorder

## 2025-07-24 ENCOUNTER — TELEPHONE (OUTPATIENT)
Dept: SLEEP CENTER | Facility: CLINIC | Age: 70
End: 2025-07-24

## 2025-07-24 NOTE — TELEPHONE ENCOUNTER
[facility] [place]

## 2025-07-28 LAB
